# Patient Record
Sex: MALE | Race: WHITE | Employment: OTHER | URBAN - METROPOLITAN AREA
[De-identification: names, ages, dates, MRNs, and addresses within clinical notes are randomized per-mention and may not be internally consistent; named-entity substitution may affect disease eponyms.]

---

## 2017-02-15 ENCOUNTER — HOSPITAL ENCOUNTER (EMERGENCY)
Age: 72
Discharge: HOME | End: 2017-02-15
Attending: EMERGENCY MEDICINE
Payer: MEDICARE

## 2017-02-15 VITALS
RESPIRATION RATE: 20 BRPM | TEMPERATURE: 100 F | SYSTOLIC BLOOD PRESSURE: 113 MMHG | DIASTOLIC BLOOD PRESSURE: 52 MMHG | HEIGHT: 69 IN | BODY MASS INDEX: 38.51 KG/M2 | WEIGHT: 260 LBS | HEART RATE: 88 BPM | OXYGEN SATURATION: 99 %

## 2017-02-15 DIAGNOSIS — B34.9 VIRAL SYNDROME: Primary | ICD-10-CM

## 2017-02-15 LAB
BILIRUB UR QL STRIP.AUTO: NEGATIVE
CLARITY UR REFRACT.AUTO: CLEAR
COLOR UR AUTO: YELLOW
GLUCOSE UR STRIP.AUTO-MCNC: NEGATIVE MG/DL
KETONES UR STRIP.AUTO-MCNC: NEGATIVE MG/DL
LEUKOCYTE ESTERASE UR QL STRIP.AUTO: NEGATIVE
NITRITE UR QL STRIP.AUTO: NEGATIVE
PH UR STRIP.AUTO: 6.5 [PH] (ref 4.5–8)
RBC UR QL AUTO: NEGATIVE
SP GR UR STRIP.AUTO: 1.02 (ref 1–1.03)
UROBILINOGEN UR STRIP.AUTO-MCNC: 0.2 MG/DL

## 2017-02-15 PROCEDURE — 99284 EMERGENCY DEPT VISIT MOD MDM: CPT

## 2017-02-15 PROCEDURE — 96374 THER/PROPH/DIAG INJ IV PUSH: CPT

## 2017-02-15 PROCEDURE — 81003 URINALYSIS AUTO W/O SCOPE: CPT | Performed by: EMERGENCY MEDICINE

## 2017-02-15 PROCEDURE — 96361 HYDRATE IV INFUSION ADD-ON: CPT

## 2017-02-15 RX ORDER — OSELTAMIVIR PHOSPHATE 75 MG/1
75 CAPSULE ORAL 2 TIMES DAILY
Qty: 10 CAPSULE | Refills: 0 | Status: ON HOLD | OUTPATIENT
Start: 2017-02-15 | End: 2017-02-16

## 2017-02-15 RX ORDER — KETOROLAC TROMETHAMINE 30 MG/ML
30 INJECTION, SOLUTION INTRAMUSCULAR; INTRAVENOUS ONCE
Status: COMPLETED | OUTPATIENT
Start: 2017-02-15 | End: 2017-02-15

## 2017-02-15 NOTE — ED PROVIDER NOTES
Patient Seen in: THE [facility] Emergency Department In [place]    History   Patient presents with:  Cough/URI    Stated Complaint: cough and fever    HPI    21-year-old male presents with rhinorrhea, body aches, fever. Symptoms began 2 days ago.   Patient re 1 tablet (20 mg total) by mouth nightly. Dutasteride 0.5 MG Oral Cap,  Take 1 capsule (0.5 mg total) by mouth daily. Silodosin (RAPAFLO) 8 MG Oral Cap,  Take 1 capsule (8 mg total) by mouth daily. GLUCOSAMINE-CHONDROITIN OR,  Take by mouth.    Aury Pascal 99%        Physical Exam    General:  Vitals as listed. No acute distress   HEENT: Sclerae anicteric. Conjunctivae show no pallor. Dry mucous membranes. No posterior pharyngeal exudate.   No palpable lymphadenopathy  Neck: supple, no rigidity   Lungs: g

## 2017-02-16 ENCOUNTER — HOSPITAL ENCOUNTER (OUTPATIENT)
Facility: HOSPITAL | Age: 72
Setting detail: OBSERVATION
Discharge: HOME | End: 2017-02-16
Attending: EMERGENCY MEDICINE | Admitting: HOSPITALIST
Payer: MEDICARE

## 2017-02-16 ENCOUNTER — APPOINTMENT (OUTPATIENT)
Dept: GENERAL RADIOLOGY | Facility: HOSPITAL | Age: 72
End: 2017-02-16
Attending: EMERGENCY MEDICINE
Payer: MEDICARE

## 2017-02-16 VITALS
TEMPERATURE: 98 F | BODY MASS INDEX: 37.22 KG/M2 | RESPIRATION RATE: 20 BRPM | HEIGHT: 70 IN | SYSTOLIC BLOOD PRESSURE: 111 MMHG | WEIGHT: 260 LBS | OXYGEN SATURATION: 97 % | HEART RATE: 84 BPM | DIASTOLIC BLOOD PRESSURE: 49 MMHG

## 2017-02-16 DIAGNOSIS — J40 BRONCHITIS: Primary | ICD-10-CM

## 2017-02-16 LAB
ADENOVIRUS PCR:: NEGATIVE
ALBUMIN SERPL-MCNC: 3.5 G/DL (ref 3.5–4.8)
ALP LIVER SERPL-CCNC: 47 U/L (ref 45–117)
ALT SERPL-CCNC: 38 U/L (ref 17–63)
AST SERPL-CCNC: 37 U/L (ref 15–41)
B PERT DNA SPEC QL NAA+PROBE: NEGATIVE
BASOPHILS # BLD AUTO: 0.04 X10(3) UL (ref 0–0.1)
BASOPHILS NFR BLD AUTO: 0.7 %
BILIRUB SERPL-MCNC: 0.6 MG/DL (ref 0.1–2)
BUN BLD-MCNC: 16 MG/DL (ref 8–20)
C PNEUM DNA SPEC QL NAA+PROBE: NEGATIVE
CALCIUM BLD-MCNC: 8.3 MG/DL (ref 8.3–10.3)
CHLORIDE: 105 MMOL/L (ref 101–111)
CO2: 23 MMOL/L (ref 22–32)
CORONAVIRUS 229E PCR:: POSITIVE
CORONAVIRUS HKU1 PCR:: NEGATIVE
CORONAVIRUS NL63 PCR:: NEGATIVE
CORONAVIRUS OC43 PCR:: NEGATIVE
CREAT BLD-MCNC: 0.98 MG/DL (ref 0.7–1.3)
EOSINOPHIL # BLD AUTO: 0.01 X10(3) UL (ref 0–0.3)
EOSINOPHIL NFR BLD AUTO: 0.2 %
ERYTHROCYTE [DISTWIDTH] IN BLOOD BY AUTOMATED COUNT: 14.8 % (ref 11.5–16)
FLUAV H1 2009 PAND RNA SPEC QL NAA+PROBE: NEGATIVE
FLUAV H1 RNA SPEC QL NAA+PROBE: NEGATIVE
FLUAV H3 RNA SPEC QL NAA+PROBE: NEGATIVE
FLUAV RNA SPEC QL NAA+PROBE: NEGATIVE
FLUBV RNA SPEC QL NAA+PROBE: NEGATIVE
GLUCOSE BLD-MCNC: 114 MG/DL (ref 70–99)
GLUCOSE BLD-MCNC: 82 MG/DL (ref 65–99)
GLUCOSE BLD-MCNC: 98 MG/DL (ref 65–99)
HCT VFR BLD AUTO: 39.6 % (ref 37–53)
HGB BLD-MCNC: 13.6 G/DL (ref 13–17)
IMMATURE GRANULOCYTE COUNT: 0.02 X10(3) UL (ref 0–1)
IMMATURE GRANULOCYTE RATIO %: 0.3 %
LACTIC ACID: 1.5 MMOL/L (ref 0.5–2)
LYMPHOCYTES # BLD AUTO: 0.38 X10(3) UL (ref 0.9–4)
LYMPHOCYTES NFR BLD AUTO: 6.3 %
M PROTEIN MFR SERPL ELPH: 7 G/DL (ref 6.1–8.3)
MCH RBC QN AUTO: 28.4 PG (ref 27–33.2)
MCHC RBC AUTO-ENTMCNC: 34.3 G/DL (ref 31–37)
MCV RBC AUTO: 82.7 FL (ref 80–99)
METAPNEUMOVIRUS PCR:: NEGATIVE
MONOCYTES # BLD AUTO: 0.15 X10(3) UL (ref 0.1–0.6)
MONOCYTES NFR BLD AUTO: 2.5 %
MYCOPLASMA PNEUMONIA PCR:: NEGATIVE
NEUTROPHIL ABS PRELIM: 5.43 X10 (3) UL (ref 1.3–6.7)
NEUTROPHILS # BLD AUTO: 5.43 X10(3) UL (ref 1.3–6.7)
NEUTROPHILS NFR BLD AUTO: 90 %
PARAINFLUENZA 1 PCR:: NEGATIVE
PARAINFLUENZA 2 PCR:: NEGATIVE
PARAINFLUENZA 3 PCR:: NEGATIVE
PARAINFLUENZA 4 PCR:: NEGATIVE
PLATELET # BLD AUTO: 160 10(3)UL (ref 150–450)
POTASSIUM SERPL-SCNC: 4 MMOL/L (ref 3.6–5.1)
RBC # BLD AUTO: 4.79 X10(6)UL (ref 3.8–5.8)
RED CELL DISTRIBUTION WIDTH-SD: 44.6 FL (ref 35.1–46.3)
RHINOVIRUS/ENTERO PCR:: NEGATIVE
RSV RNA SPEC QL NAA+PROBE: NEGATIVE
SODIUM SERPL-SCNC: 142 MMOL/L (ref 136–144)
TROPONIN: <0.046 NG/ML (ref ?–0.05)
WBC # BLD AUTO: 6 X10(3) UL (ref 4–13)

## 2017-02-16 PROCEDURE — 99285 EMERGENCY DEPT VISIT HI MDM: CPT

## 2017-02-16 PROCEDURE — 82962 GLUCOSE BLOOD TEST: CPT

## 2017-02-16 PROCEDURE — 83605 ASSAY OF LACTIC ACID: CPT | Performed by: EMERGENCY MEDICINE

## 2017-02-16 PROCEDURE — 36415 COLL VENOUS BLD VENIPUNCTURE: CPT

## 2017-02-16 PROCEDURE — 87633 RESP VIRUS 12-25 TARGETS: CPT | Performed by: EMERGENCY MEDICINE

## 2017-02-16 PROCEDURE — 93010 ELECTROCARDIOGRAM REPORT: CPT

## 2017-02-16 PROCEDURE — 71020 XR CHEST PA + LAT CHEST (CPT=71020): CPT

## 2017-02-16 PROCEDURE — 84484 ASSAY OF TROPONIN QUANT: CPT | Performed by: EMERGENCY MEDICINE

## 2017-02-16 PROCEDURE — 87999 UNLISTED MICROBIOLOGY PX: CPT

## 2017-02-16 PROCEDURE — 87798 DETECT AGENT NOS DNA AMP: CPT | Performed by: EMERGENCY MEDICINE

## 2017-02-16 PROCEDURE — 87040 BLOOD CULTURE FOR BACTERIA: CPT | Performed by: EMERGENCY MEDICINE

## 2017-02-16 PROCEDURE — 93005 ELECTROCARDIOGRAM TRACING: CPT

## 2017-02-16 PROCEDURE — 87486 CHLMYD PNEUM DNA AMP PROBE: CPT | Performed by: EMERGENCY MEDICINE

## 2017-02-16 PROCEDURE — 85025 COMPLETE CBC W/AUTO DIFF WBC: CPT | Performed by: EMERGENCY MEDICINE

## 2017-02-16 PROCEDURE — 96360 HYDRATION IV INFUSION INIT: CPT

## 2017-02-16 PROCEDURE — 80053 COMPREHEN METABOLIC PANEL: CPT | Performed by: EMERGENCY MEDICINE

## 2017-02-16 PROCEDURE — 87581 M.PNEUMON DNA AMP PROBE: CPT | Performed by: EMERGENCY MEDICINE

## 2017-02-16 RX ORDER — SOTALOL HYDROCHLORIDE 80 MG/1
120 TABLET ORAL
Status: DISCONTINUED | OUTPATIENT
Start: 2017-02-16 | End: 2017-02-16

## 2017-02-16 RX ORDER — DILTIAZEM HYDROCHLORIDE 240 MG/1
240 CAPSULE, COATED, EXTENDED RELEASE ORAL
Status: DISCONTINUED | OUTPATIENT
Start: 2017-02-16 | End: 2017-02-16

## 2017-02-16 RX ORDER — ALFUZOSIN HYDROCHLORIDE 10 MG/1
10 TABLET, EXTENDED RELEASE ORAL
Status: DISCONTINUED | OUTPATIENT
Start: 2017-02-16 | End: 2017-02-16

## 2017-02-16 RX ORDER — IBUPROFEN 600 MG/1
600 TABLET ORAL EVERY 6 HOURS PRN
Status: DISCONTINUED | OUTPATIENT
Start: 2017-02-16 | End: 2017-02-16

## 2017-02-16 RX ORDER — ACETAMINOPHEN 500 MG
1000 TABLET ORAL ONCE
Status: COMPLETED | OUTPATIENT
Start: 2017-02-16 | End: 2017-02-16

## 2017-02-16 RX ORDER — AMOXICILLIN 500 MG
1 CAPSULE ORAL
COMMUNITY

## 2017-02-16 RX ORDER — SODIUM CHLORIDE 9 MG/ML
INJECTION, SOLUTION INTRAVENOUS CONTINUOUS
Status: DISCONTINUED | OUTPATIENT
Start: 2017-02-16 | End: 2017-02-16

## 2017-02-16 RX ORDER — FINASTERIDE 5 MG/1
5 TABLET, FILM COATED ORAL DAILY
Status: DISCONTINUED | OUTPATIENT
Start: 2017-02-16 | End: 2017-02-16

## 2017-02-16 RX ORDER — ACETAMINOPHEN 325 MG/1
650 TABLET ORAL EVERY 6 HOURS PRN
Status: DISCONTINUED | OUTPATIENT
Start: 2017-02-16 | End: 2017-02-16

## 2017-02-16 RX ORDER — SODIUM CHLORIDE 9 MG/ML
INJECTION, SOLUTION INTRAVENOUS CONTINUOUS
Status: ACTIVE | OUTPATIENT
Start: 2017-02-16 | End: 2017-02-16

## 2017-02-16 RX ORDER — ONDANSETRON 2 MG/ML
4 INJECTION INTRAMUSCULAR; INTRAVENOUS EVERY 6 HOURS PRN
Status: DISCONTINUED | OUTPATIENT
Start: 2017-02-16 | End: 2017-02-16

## 2017-02-16 RX ORDER — HEPARIN SODIUM 5000 [USP'U]/ML
5000 INJECTION, SOLUTION INTRAVENOUS; SUBCUTANEOUS EVERY 12 HOURS
Status: DISCONTINUED | OUTPATIENT
Start: 2017-02-16 | End: 2017-02-16

## 2017-02-16 NOTE — CM/SW NOTE
02/16/17 1400   CM/SW Screening   Referral [id number] staff; Chart review;Nursing rounds   Patient's Mental Status Alert;Oriented   Patient lives with Spouse   Patient Status Prior to Admission   Independent with ADLs an

## 2017-02-16 NOTE — ED NOTES
Pt is resting on cart with wife at bedside. Pt states he feels better at this time. Will continue to monitor pt.

## 2017-02-16 NOTE — H&P
.  CC: Patient presents with:  Fever Sepsis (infectious)       PCP: Sanford Ocampo MD    History of Present Illness: Patient is a 66year old male with PMH sig for afib on sotalol and xarelto, HTN who presents with fever.  Started 4 days ago with fever to 10 mg total) by mouth 2 (two) times daily. Disp: 10 capsule Rfl: 0   DiltiaZEM HCl ER Coated Beads 240 MG Oral Capsule SR 24 Hr Take 1 capsule (240 mg total) by mouth once daily.  Disp: 90 capsule Rfl: 2   Rivaroxaban (XARELTO) 20 MG Oral Tab Take 1 tablet (20 Relation Age of Onset   • Cancer Mother    • Diabetes Mother        Review of Systems  Comprehensive ROS reviewed and negative except for what's stated above.        OBJECTIVE:  /64 mmHg  Pulse 85  Temp(Src) 98.7 °F (37.1 °C) (Oral)  Resp 20  Ht 5' 10 MD on 2/16/2017 at 8:06     Approved by: Leopoldo Moody, MD               [place] [facility] note reviewed    ASSESSMENT / PLAN:     Fever- likely 2/2 +coronavirus. Some of his URI sx have been improving.  - manage supportively with tylenol prn.  Ibuprofen prn okay to

## 2017-02-16 NOTE — PROGRESS NOTES
02/16/17 9982   Provider Notification   Reason for Communication Review case   Provider Name Dr. Aaron Sparks   Method of Communication Page   Response Waiting for response   Pt to floor. Medications updated, new meds added.  Pt takes metformin at home, accuchec

## 2017-02-16 NOTE — ED INITIAL ASSESSMENT (HPI)
Pt states he had a fever of 102.4 and had 3 advils. Pt states he has chills. He was seen in [facility] yesterday and was told he has the flu.

## 2017-02-16 NOTE — PLAN OF CARE
Assumed care of patient at7am  AOx4 in NAD; temp 101.8 this am with 3 hours prior tylenol administration; Dr Herminio Smith notified; order for advil;    Result from flue swab + corona virus  Patient noted to be perspiring; temp rechecked 98.7 F; per request advil s

## 2017-02-16 NOTE — PROGRESS NOTES
Pt admitted to floor. Wife at bedside. Navigator complete. Med rec updated and Md paged. Pt with no pain, no SOB. No major complaints. RA. VSS. Up with assist. Tele and  on. IV fluids started. PT on droplet isolation to rule our flu.  Pt resting in bed a

## 2017-02-16 NOTE — ED PROVIDER NOTES
Patient Seen in: [facility] Emergency Department    History   Patient presents with:  Fever Sepsis (infectious)    Stated Complaint: FLU SXS, FEVER    HPI    The patient is a 79-year-old male with multiple past medical problems, presenting to the [facility] daily.   Rivaroxaban (XARELTO) 20 MG Oral Tab,  Take 1 tablet (20 mg total) by mouth once daily. Quinapril HCl 40 MG Oral Tab,  Take 1 tablet (40 mg total) by mouth 2 (two) times daily.    Niacin ER, Antihyperlipidemic, 1000 MG Oral Tab CR,  Take 1 tablet otherwise stated in HPI.     Physical Exam       ED Triage Vitals   BP 02/16/17 0229 143/66 mmHg   Pulse 02/16/17 0229 80   Resp 02/16/17 0229 18   Temp 02/16/17 0229 99.7 °F (37.6 °C)   Temp src 02/16/17 0229 Oral   SpO2 02/16/17 0229 95 %   O2 Device 02/1 following orders were created for panel order CBC WITH DIFFERENTIAL WITH PLATELET.   Procedure                               Abnormality         Status                     ---------                               -----------         ------ radiology. I discussed with the patient and his wife outpatient management of bronchitis and influenza, and his wife is uncomfortable bringing him home due to his previous history of atrial fibrillation, that seems to be exacerbated with previous URIs.   C

## 2017-02-17 LAB
ATRIAL RATE: 75 BPM
P AXIS: 44 DEGREES
P-R INTERVAL: 196 MS
Q-T INTERVAL: 408 MS
QRS DURATION: 112 MS
QTC CALCULATION (BEZET): 455 MS
R AXIS: -46 DEGREES
T AXIS: 2 DEGREES
VENTRICULAR RATE: 75 BPM

## 2017-04-04 RX ORDER — MULTIVITAMIN
1 TABLET ORAL DAILY
COMMUNITY

## 2017-04-04 RX ORDER — MULTIVIT-MIN/IRON/FOLIC ACID/K 18-600-40
3000 CAPSULE ORAL DAILY
COMMUNITY

## 2017-04-04 RX ORDER — ASCORBIC ACID 500 MG
1000 TABLET ORAL DAILY
COMMUNITY

## 2017-04-07 ENCOUNTER — HOSPITAL ENCOUNTER (OUTPATIENT)
Dept: INTERVENTIONAL RADIOLOGY/VASCULAR | Facility: HOSPITAL | Age: 72
Discharge: HOME | End: 2017-04-07
Attending: INTERNAL MEDICINE | Admitting: INTERNAL MEDICINE
Payer: MEDICARE

## 2017-04-07 VITALS
WEIGHT: 260 LBS | OXYGEN SATURATION: 100 % | DIASTOLIC BLOOD PRESSURE: 70 MMHG | RESPIRATION RATE: 18 BRPM | HEIGHT: 70 IN | SYSTOLIC BLOOD PRESSURE: 117 MMHG | BODY MASS INDEX: 37.22 KG/M2 | HEART RATE: 66 BPM

## 2017-04-07 DIAGNOSIS — I48.19 PERSISTENT ATRIAL FIBRILLATION (HCC): ICD-10-CM

## 2017-04-07 PROCEDURE — 92960 CARDIOVERSION ELECTRIC EXT: CPT

## 2017-04-07 PROCEDURE — 5A2204Z RESTORATION OF CARDIAC RHYTHM, SINGLE: ICD-10-PCS | Performed by: INTERNAL MEDICINE

## 2017-04-07 PROCEDURE — 93010 ELECTROCARDIOGRAM REPORT: CPT | Performed by: INTERNAL MEDICINE

## 2017-04-07 PROCEDURE — 93005 ELECTROCARDIOGRAM TRACING: CPT

## 2017-04-07 RX ORDER — SODIUM CHLORIDE 9 MG/ML
INJECTION, SOLUTION INTRAVENOUS CONTINUOUS
Status: DISCONTINUED | OUTPATIENT
Start: 2017-04-07 | End: 2017-04-07

## 2017-04-07 NOTE — H&P
ASSESSMENT:      - PAF last episode 6/06 now recurrence 3/2017 on sotalol  - increased weight 255  - DM  - HTN  - Hyperlipidemia  - RENETTA - CPAP  - LAFB    - Xarelto       PLAN:      - EKG normal no QT prolongation Sotalol proarrhythmia reviewed  - Risk o about 1.0 oz of alcohol per week.  He reports that he does not use illicit drugs.       Allergies:  No Known Allergies    Current Meds:    Current Outpatient Prescriptions:  Sotalol HCl 120 MG Oral Tab  Take 1 tablet (120 mg total) by mouth 2 (two) times da qday  Disp:   Rfl:     VITAMIN C OR  1 po qday  Disp:   Rfl:               ROS:    Constitutional: negative for fevers  Eyes: negative for visual disturbance  Ears, nose, mouth, throat, and face: negative for epistaxis  Respiratory: negative for dyspnea on Value    03/23/2017  51    08/01/2016  50    01/08/2016      Comment:    Unable to calculate LDL when TGL > 400.      ----------  TRIGLYCERIDES (mg/dL)    Date  Value    01/30/2015  321*    04/15/2014  233*    01/28/2014  225*    ----------  AST (SGOT) (IU

## 2017-04-07 NOTE — PROCEDURES
[facility]    PATIENT'S NAME: Dede Hartman   ATTENDING PHYSICIAN: Tino Leong M.D. OPERATING PHYSICIAN: Tino Leong M.D.    PATIENT ACCOUNT#:   [de-identified]    LOCATION:  [facility] 7 EDWP 10  MEDICAL RECORD #:   [id number]       DATE OF BIR

## 2017-04-19 ENCOUNTER — APPOINTMENT (OUTPATIENT)
Dept: CT IMAGING | Facility: HOSPITAL | Age: 72
DRG: 872 | End: 2017-04-19
Payer: MEDICARE

## 2017-04-19 ENCOUNTER — HOSPITAL ENCOUNTER (INPATIENT)
Facility: HOSPITAL | Age: 72
LOS: 3 days | Discharge: HOME | DRG: 872 | End: 2017-04-24
Admitting: INTERNAL MEDICINE
Payer: MEDICARE

## 2017-04-19 DIAGNOSIS — R10.9 FLANK PAIN, ACUTE: Primary | ICD-10-CM

## 2017-04-19 DIAGNOSIS — R50.9 FEVER, UNSPECIFIED FEVER CAUSE: ICD-10-CM

## 2017-04-19 PROCEDURE — 83690 ASSAY OF LIPASE: CPT

## 2017-04-19 PROCEDURE — 80053 COMPREHEN METABOLIC PANEL: CPT

## 2017-04-19 PROCEDURE — 96365 THER/PROPH/DIAG IV INF INIT: CPT

## 2017-04-19 PROCEDURE — 96361 HYDRATE IV INFUSION ADD-ON: CPT

## 2017-04-19 PROCEDURE — 36415 COLL VENOUS BLD VENIPUNCTURE: CPT

## 2017-04-19 PROCEDURE — 81003 URINALYSIS AUTO W/O SCOPE: CPT

## 2017-04-19 PROCEDURE — 74176 CT ABD & PELVIS W/O CONTRAST: CPT

## 2017-04-19 PROCEDURE — 87040 BLOOD CULTURE FOR BACTERIA: CPT

## 2017-04-19 PROCEDURE — 87086 URINE CULTURE/COLONY COUNT: CPT

## 2017-04-19 PROCEDURE — 99285 EMERGENCY DEPT VISIT HI MDM: CPT

## 2017-04-19 PROCEDURE — 85025 COMPLETE CBC W/AUTO DIFF WBC: CPT

## 2017-04-19 RX ORDER — ACETAMINOPHEN 500 MG
1000 TABLET ORAL ONCE
Status: COMPLETED | OUTPATIENT
Start: 2017-04-19 | End: 2017-04-19

## 2017-04-19 RX ORDER — SODIUM CHLORIDE 9 MG/ML
INJECTION, SOLUTION INTRAVENOUS CONTINUOUS
Status: DISCONTINUED | OUTPATIENT
Start: 2017-04-19 | End: 2017-04-20

## 2017-04-19 RX ORDER — HYDROMORPHONE HYDROCHLORIDE 1 MG/ML
1 INJECTION, SOLUTION INTRAMUSCULAR; INTRAVENOUS; SUBCUTANEOUS ONCE
Status: DISCONTINUED | OUTPATIENT
Start: 2017-04-19 | End: 2017-04-24

## 2017-04-20 PROBLEM — R10.9 FLANK PAIN, ACUTE: Status: ACTIVE | Noted: 2017-04-20

## 2017-04-20 PROBLEM — R50.9 FEVER, UNSPECIFIED FEVER CAUSE: Status: ACTIVE | Noted: 2017-04-20

## 2017-04-20 PROCEDURE — 83036 HEMOGLOBIN GLYCOSYLATED A1C: CPT | Performed by: INTERNAL MEDICINE

## 2017-04-20 PROCEDURE — 85025 COMPLETE CBC W/AUTO DIFF WBC: CPT | Performed by: INTERNAL MEDICINE

## 2017-04-20 PROCEDURE — 80053 COMPREHEN METABOLIC PANEL: CPT | Performed by: INTERNAL MEDICINE

## 2017-04-20 PROCEDURE — 96375 TX/PRO/DX INJ NEW DRUG ADDON: CPT

## 2017-04-20 PROCEDURE — 82962 GLUCOSE BLOOD TEST: CPT

## 2017-04-20 PROCEDURE — 94660 CPAP INITIATION&MGMT: CPT

## 2017-04-20 PROCEDURE — 93010 ELECTROCARDIOGRAM REPORT: CPT | Performed by: INTERNAL MEDICINE

## 2017-04-20 PROCEDURE — 84443 ASSAY THYROID STIM HORMONE: CPT | Performed by: HOSPITALIST

## 2017-04-20 PROCEDURE — 83605 ASSAY OF LACTIC ACID: CPT | Performed by: HOSPITALIST

## 2017-04-20 PROCEDURE — 93005 ELECTROCARDIOGRAM TRACING: CPT

## 2017-04-20 RX ORDER — HYDROMORPHONE HYDROCHLORIDE 1 MG/ML
0.8 INJECTION, SOLUTION INTRAMUSCULAR; INTRAVENOUS; SUBCUTANEOUS EVERY 2 HOUR PRN
Status: DISCONTINUED | OUTPATIENT
Start: 2017-04-20 | End: 2017-04-24

## 2017-04-20 RX ORDER — SODIUM CHLORIDE 9 MG/ML
INJECTION, SOLUTION INTRAVENOUS CONTINUOUS
Status: DISCONTINUED | OUTPATIENT
Start: 2017-04-20 | End: 2017-04-22

## 2017-04-20 RX ORDER — LATANOPROST 50 UG/ML
1 SOLUTION/ DROPS OPHTHALMIC NIGHTLY
Status: DISCONTINUED | OUTPATIENT
Start: 2017-04-20 | End: 2017-04-24

## 2017-04-20 RX ORDER — ONDANSETRON 2 MG/ML
4 INJECTION INTRAMUSCULAR; INTRAVENOUS EVERY 4 HOURS PRN
Status: CANCELLED | OUTPATIENT
Start: 2017-04-20

## 2017-04-20 RX ORDER — ONDANSETRON 2 MG/ML
4 INJECTION INTRAMUSCULAR; INTRAVENOUS EVERY 6 HOURS PRN
Status: DISCONTINUED | OUTPATIENT
Start: 2017-04-20 | End: 2017-04-24

## 2017-04-20 RX ORDER — SOTALOL HYDROCHLORIDE 80 MG/1
120 TABLET ORAL 2 TIMES DAILY
Status: DISCONTINUED | OUTPATIENT
Start: 2017-04-20 | End: 2017-04-24

## 2017-04-20 RX ORDER — FINASTERIDE 5 MG/1
5 TABLET, FILM COATED ORAL DAILY
Status: DISCONTINUED | OUTPATIENT
Start: 2017-04-20 | End: 2017-04-24

## 2017-04-20 RX ORDER — DILTIAZEM HYDROCHLORIDE 240 MG/1
240 CAPSULE, COATED, EXTENDED RELEASE ORAL
Status: DISCONTINUED | OUTPATIENT
Start: 2017-04-20 | End: 2017-04-24

## 2017-04-20 RX ORDER — HYDROMORPHONE HYDROCHLORIDE 1 MG/ML
0.5 INJECTION, SOLUTION INTRAMUSCULAR; INTRAVENOUS; SUBCUTANEOUS EVERY 30 MIN PRN
Status: CANCELLED | OUTPATIENT
Start: 2017-04-20 | End: 2017-04-20

## 2017-04-20 RX ORDER — KETOROLAC TROMETHAMINE 30 MG/ML
15 INJECTION, SOLUTION INTRAMUSCULAR; INTRAVENOUS EVERY 6 HOURS PRN
Status: ACTIVE | OUTPATIENT
Start: 2017-04-20 | End: 2017-04-22

## 2017-04-20 RX ORDER — PRAVASTATIN SODIUM 20 MG
20 TABLET ORAL NIGHTLY
Status: DISCONTINUED | OUTPATIENT
Start: 2017-04-20 | End: 2017-04-24

## 2017-04-20 RX ORDER — HYDROMORPHONE HYDROCHLORIDE 1 MG/ML
0.2 INJECTION, SOLUTION INTRAMUSCULAR; INTRAVENOUS; SUBCUTANEOUS EVERY 2 HOUR PRN
Status: DISCONTINUED | OUTPATIENT
Start: 2017-04-20 | End: 2017-04-24

## 2017-04-20 RX ORDER — HYDROMORPHONE HYDROCHLORIDE 1 MG/ML
0.4 INJECTION, SOLUTION INTRAMUSCULAR; INTRAVENOUS; SUBCUTANEOUS EVERY 2 HOUR PRN
Status: DISCONTINUED | OUTPATIENT
Start: 2017-04-20 | End: 2017-04-24

## 2017-04-20 RX ORDER — HYDROCODONE BITARTRATE AND ACETAMINOPHEN 5; 325 MG/1; MG/1
1-2 TABLET ORAL
Qty: 20 TABLET | Refills: 0 | Status: SHIPPED | OUTPATIENT
Start: 2017-04-20 | End: 2017-04-30

## 2017-04-20 RX ORDER — ACETAMINOPHEN 325 MG/1
650 TABLET ORAL EVERY 6 HOURS PRN
Status: DISCONTINUED | OUTPATIENT
Start: 2017-04-20 | End: 2017-04-24

## 2017-04-20 RX ORDER — METRONIDAZOLE 500 MG/1
500 TABLET ORAL ONCE
Status: COMPLETED | OUTPATIENT
Start: 2017-04-20 | End: 2017-04-20

## 2017-04-20 RX ORDER — DEXTROSE MONOHYDRATE 25 G/50ML
50 INJECTION, SOLUTION INTRAVENOUS
Status: DISCONTINUED | OUTPATIENT
Start: 2017-04-20 | End: 2017-04-24

## 2017-04-20 RX ORDER — IBUPROFEN 800 MG/1
800 TABLET ORAL EVERY 8 HOURS PRN
Qty: 42 TABLET | Refills: 0 | Status: SHIPPED | OUTPATIENT
Start: 2017-04-20 | End: 2017-06-19

## 2017-04-20 RX ORDER — DILTIAZEM HYDROCHLORIDE 5 MG/ML
10 INJECTION INTRAVENOUS ONCE
Status: COMPLETED | OUTPATIENT
Start: 2017-04-20 | End: 2017-04-20

## 2017-04-20 RX ORDER — KETOROLAC TROMETHAMINE 30 MG/ML
30 INJECTION, SOLUTION INTRAMUSCULAR; INTRAVENOUS EVERY 6 HOURS PRN
Status: DISCONTINUED | OUTPATIENT
Start: 2017-04-20 | End: 2017-04-20

## 2017-04-20 RX ORDER — SODIUM CHLORIDE 9 MG/ML
INJECTION, SOLUTION INTRAVENOUS CONTINUOUS
Status: CANCELLED | OUTPATIENT
Start: 2017-04-20 | End: 2017-04-20

## 2017-04-20 RX ORDER — ALFUZOSIN HYDROCHLORIDE 10 MG/1
10 TABLET, EXTENDED RELEASE ORAL
Status: DISCONTINUED | OUTPATIENT
Start: 2017-04-20 | End: 2017-04-24

## 2017-04-20 RX ORDER — LISINOPRIL 40 MG/1
40 TABLET ORAL DAILY
Status: DISCONTINUED | OUTPATIENT
Start: 2017-04-20 | End: 2017-04-24

## 2017-04-20 RX ORDER — ASPIRIN 81 MG/1
81 TABLET, CHEWABLE ORAL DAILY
Status: DISCONTINUED | OUTPATIENT
Start: 2017-04-20 | End: 2017-04-24

## 2017-04-20 NOTE — PLAN OF CARE
CARDIOVASCULAR - ADULT    • Maintains optimal cardiac output and hemodynamic stability Progressing        PAIN - ADULT    • Verbalizes/displays adequate comfort level or patient's stated pain goal Progressing        Patient/Family Goals    • Patient/Family

## 2017-04-20 NOTE — PROGRESS NOTES
[facility] Pharmacy Note:  Age Based Dose Adjustment    Araceli [place] has been prescribed ketorolac (TORADOL) 30 mg IV every 6 hours as needed. Patient is >71 years old therefore the dose has been adjusted to 15 mg IV every 6 hours as needed.       Thank you

## 2017-04-20 NOTE — RESPIRATORY THERAPY NOTE
RENETTA : EQUIPMENT USE: DAILY SUMMARY                                            SET MODE:                                          USAGE IN HOURS:                                          90% EXP. PRESSURE(EPAP):

## 2017-04-20 NOTE — CM/SW NOTE
04/20/17 1400   CM/SW Screening   Referral [facility] staff; Chart review;Nursing rounds   Patient's Mental Status Alert;Oriented   Patient lives with Spouse   Patient Status Prior to Admission   Independent with ADLs an

## 2017-04-20 NOTE — CONSULTS
[facility]  Report of Consultation    Nasir Rodriguez Patient Status:  Observation    1945 MRN [id number]   [facility] 5NW-A Attending Ezio Azevedo, *   Hosp Day # 1 PCP Isaak Ahumada MD     Reason for Consultation: Allergies    Current Medications:      Current facility-administered medications:   •  0.9%  NaCl infusion, , Intravenous, Continuous  •  acetaminophen (TYLENOL) tab 650 mg, 650 mg, Oral, Q6H PRN  •  ondansetron HCl (ZOFRAN) injection 4 mg, 4 mg, Intraveno current facility-administered medications on file prior to encounter. Current Outpatient Prescriptions on File Prior to Encounter:  Vitamin D, Cholecalciferol, 1000 units Oral Tab Take 1,000 Units by mouth daily.  Disp:  Rfl:    Vitamin C 500 MG Oral Tab T Systems:    10 point review performed pertinent positives and negatives per history of present illness. Physical Exam:    Blood pressure 83/59, pulse 77, temperature 100 °F (37.8 °C), temperature source Oral, resp.  rate 18, height 70\", weight 255 lb (1 paravertebral soft tissues are unremarkable. 4/19/2017  IMPRESSION: 1. No lumbar vertebral compression deformities. 2.  Mild-moderate lumbar disc narrowing, spondylosis, and facet arthrosis. 3.  Normal vertebral bony mineralization.      Ct Abdomen+pel appendigitis. There is a few scattered diverticula of the left colon without evidence of diverticulitis. ABDOMINAL WALL:  Left inguinal hernia containing fat. BONES:  Mild degenerative changes of the spine. No bony lesion or fracture.  PELVIC ORGANS:  Mode

## 2017-04-20 NOTE — ED PROVIDER NOTES
Patient Seen in: [facility] Emergency Department    History   Patient presents with:  Fever (infectious)    Stated Complaint: FEVER    HPI    Patient is a pleasant 53-year-old presented to ER with complaint that over the past few days he has had lef 1,000 mg by mouth every evening. Vitamin C 500 MG Oral Tab,  Take 500 mg by mouth daily. Sotalol HCl 120 MG Oral Tab,  Take 1 tablet (120 mg total) by mouth 2 (two) times daily.    Dutasteride 0.5 MG Oral Cap,  Take 1 capsule (0.5 mg total) by mouth silvia BP 04/19/17 2105 143/90 mmHg   Pulse 04/19/17 2105 104   Resp 04/19/17 2105 22   Temp 04/19/17 2105 99.4 °F (37.4 °C)   Temp src 04/19/17 2105 Temporal   SpO2 04/19/17 2105 96 %   O2 Device 04/19/17 2057 None (Room air)       Current:/82 mmHg  Puls Abnormality         Status                     ---------                               -----------         ------                     CBC W/ DIFFERENTIAL[[id number]]          Abnormal            Final result                 Please view results for th bladder. 2D rendering are generated on the CT scanner workstation to localize potential stones in the cranio-caudal plane. Dose reduction techniques were used.  Dose information is transmitted to the [facility] [employer] of Radiology) Vineet Loo 35 ([address] Nuria Mendez related, the possibility of early pyelonephritis on the left cannot be completely excluded. Correlation with urinalysis is recommended. 3. Prostatic hypertrophy. 4. Left inguinal fat-containing hernia. Dictated by:  Sejal Hines MD on 4/19/2017 at 22:50 Medication List

## 2017-04-20 NOTE — H&P
[facility] Hospitalist H&P       CC: L lower back/flank pain, fevers    PCP: Tree Godoy MD    History of Present Illness:     Pt is a 67 yo with HTN/HL, atrial fibrillation, RENETTA, morbid obesity, who is admitted for L lower back/flank pain.  Pt reports that date: 11/30/2013    Smokeless tobacco: Never Used    Comment: cigar 1 wk; quit cigs 1986    Alcohol Use: No    Comment: socially        Fam Hx  Family History   Problem Relation Age of Onset   • Cancer Mother    • Diabetes Mother        Review of Systems 119      Radiology: Xr Lumbar Spine (min 4 Views) (cpt=72110)    4/19/2017  LUMBAR SPINE RADIOGRAPHIC SERIES, 4 Views, Including Dynamic Lateral Flexion-Extension Films, 4/18/2017. COMPARISON STUDIES: None.  CLINICAL HISTORY:   Persistent nontraumatic low b There is perinephric stranding of both kidneys, left worse than right, which is likely age related. It is possible that the stranding could represent infection within the kidney in the correct clinical setting. Correlation with urinalysis is recommended.  Salma Reynaga revealed epiploic appendigitis of sigmoid colon and some perinephric stranding on the L-- interesting that pt does NOT have abdominal symptoms or dysuria/hematuria.  So sepsis of possible GI or  origin  -UA is negative, await UC.  BC pending  -received I

## 2017-04-20 NOTE — PROGRESS NOTES
Brief Internal Medicine Note    Full Note to Follow      Pt is a 65 yo with HTN/HL, atrial fibrillation, RENETTA, morbid obesity, who is admitted for L lower back/flank pain.   Pt reports that approx 4 days ago, started having sharp moderate-severe intermitte

## 2017-04-20 NOTE — PROGRESS NOTES
Assumed care at 4:34 pm. A/Ox4. On ra. RENETTA protocol. Tolerating clear liquid. Voids. Up with SBA. QID accucheck. Fluids infusing. Will cont to monitor.

## 2017-04-20 NOTE — CONSULTS
INFECTIOUS DISEASE CONSULT NOTE    Guevara Lea Patient Status:  Observation    1945 MRN [id number]   [facility] 5NW-A Attending He Reyes, *   Hosp Day # 1 PCP B Onset   • Cancer Mother    • Diabetes Mother       reports that he quit smoking about 3 years ago. His smoking use included Cigars. He has never used smokeless tobacco. He reports that he does not drink alcohol or use illicit drugs.     Allergies:  No Known mL IVPB, 4.5 g, Intravenous, Q8H  •  ketorolac tromethamine (TORADOL) 30 MG/ML injection 15 mg, 15 mg, Intravenous, Q6H PRN  •  HYDROmorphone HCl PF (DILAUDID) 1 MG/ML injection 1 mg, 1 mg, Intravenous, Once    Review of Systems:    Completed.  See pertinen Data:    Recent Labs   Lab  04/19/17 2115  04/20/17   0619   RBC  5.62  5.54   HGB  15.3  15.2   HCT  45.8  46.3   MCV  81.5  83.6   MCH  27.2  27.4   MCHC  33.4  32.8   RDW  15.3  15.5   NEPRELIM  9.92*  10.87*   WBC  11.0  11.7   PLT  180.0  162.0 mineralization. Ct Abdomen+pelvis Kidneystone 2d Rndr(no Iv,no Oral)(cpt=74176)    4/19/2017  PROCEDURE:  CT ABDOMEN+PELVIS KIDNEYSTONE 2D RNDR(NO IV,NO ORAL)(CPT=74176)  COMPARISON:  None.   INDICATIONS:  FEVER, BACK PAIN, HAD XRAY OF BACK  TECHNIQUE: fracture. PELVIC ORGANS:  Moderate Prosthetic hypertrophy. Andressa Band LUNG BASES:  Normal.  No visible pulmonary or pleural disease. 4/19/2017  CONCLUSION:   1. Epiploic appendigitis of the sigmoid colon. 2. No definite stones or hydronephrosis.  Perinephric

## 2017-04-20 NOTE — ED INITIAL ASSESSMENT (HPI)
Patient with back pain which started Sunday night, he thought he strained his back and had a negative X-ray. He developed fevers and chills since yesterday which has been worsening. Patient taking Tylenol and muscle relaxers without relief.  Pain to left fl

## 2017-04-20 NOTE — ED NOTES
Patient ambulatory to bathroom with dyspnea on exertion noted, did not tolerate well. Patient placed back on monitor and able to catch his breath, room air O2 sat 99% after getting back onto cart.

## 2017-04-21 PROCEDURE — 85652 RBC SED RATE AUTOMATED: CPT | Performed by: INTERNAL MEDICINE

## 2017-04-21 PROCEDURE — 87581 M.PNEUMON DNA AMP PROBE: CPT | Performed by: HOSPITALIST

## 2017-04-21 PROCEDURE — 87999 UNLISTED MICROBIOLOGY PX: CPT

## 2017-04-21 PROCEDURE — 87633 RESP VIRUS 12-25 TARGETS: CPT | Performed by: HOSPITALIST

## 2017-04-21 PROCEDURE — 86644 CMV ANTIBODY: CPT | Performed by: INTERNAL MEDICINE

## 2017-04-21 PROCEDURE — 86645 CMV ANTIBODY IGM: CPT | Performed by: INTERNAL MEDICINE

## 2017-04-21 PROCEDURE — 82962 GLUCOSE BLOOD TEST: CPT

## 2017-04-21 PROCEDURE — 85025 COMPLETE CBC W/AUTO DIFF WBC: CPT | Performed by: HOSPITALIST

## 2017-04-21 PROCEDURE — 86140 C-REACTIVE PROTEIN: CPT | Performed by: INTERNAL MEDICINE

## 2017-04-21 PROCEDURE — 80074 ACUTE HEPATITIS PANEL: CPT | Performed by: HOSPITALIST

## 2017-04-21 PROCEDURE — 87798 DETECT AGENT NOS DNA AMP: CPT | Performed by: HOSPITALIST

## 2017-04-21 PROCEDURE — 87486 CHLMYD PNEUM DNA AMP PROBE: CPT | Performed by: HOSPITALIST

## 2017-04-21 NOTE — CERTIFICATION
**Certification    PHYSICIAN Certification of Need for Inpatient Hospitalization    Based on the his current state of illness, Joshua Carlosalejandro requires inpatient hospitalization for his sepsis.   This requires inpatient medical treatment and medically requir

## 2017-04-21 NOTE — PROGRESS NOTES
[facility] Hospitalist Progress Note     PCP: Jose C Madrid MD    CC:  Follow up    SUBJECTIVE:  Laying in bed, feels the same as yesterday. L lower back/flank pain improved.   No cp/sob/n/v.  NO dysuria, NO abdominal pain    OBJECTIVE:  Temp:  [97.6 °F (36.4 ° Rivaroxaban  20 mg Oral Nightly   • Alfuzosin HCl ER  10 mg Oral Daily with breakfast   • Sotalol HCl  120 mg Oral BID   • insulin aspart  1-5 Units Subcutaneous TID CC and HS   • latanoprost  1 drop Both Eyes Nightly   • piperacillin-tazobactam  4.5 g Int Valerio Omalley MD    [facility] Hospitalist  Pager: [phone]

## 2017-04-21 NOTE — PROGRESS NOTES
Multidisciplinary Discharge Rounds held 4/21/2017. Treatment team members present today include , , Charge Nurse,  Nurse, RT, PT and Pharmacy caring for [employer].      Other care providers present:    Patient Active Probl

## 2017-04-21 NOTE — PROGRESS NOTES
[facility]  Progress Note    Sheba Mitchell Patient Status:  Observation    1945 MRN [id number]   [facility] 5NW-A Attending Jesus Carlos, *   Hosp Day # 2 PCP Hermilo Pedersen MD     Subjective:    Patient reports improv

## 2017-04-21 NOTE — RESPIRATORY THERAPY NOTE
RENETTA - Equipment Use Daily Summary:  · Set Mode CFLEX  · Usage in hours: 8  · 90% Pressure (EPAP) level:   · 90% Insp Pressure (IPAP): 11  · AHI: 1  · Supplemental Oxygen:3  · Comments:

## 2017-04-21 NOTE — PROGRESS NOTES
[facility]  TEL: [phone]  FAX: [phone]    Lisandro Umana Patient Status:  Inpatient    1945 MRN [id number]   [facility] 5NW-A Attending Alayna Celeste, *   Hosp Day # 2 PCP Cain Cobos Specimen Information: Other from Nasopharyngeal swab      Urine Culture, Routine Once [[id number]] Collected: 04/19/17 2336     Order Status: Completed Lab Status: Preliminary result Updated: 04/21/17 1002     Specimen Information: Urine from Urine, clean c fevers persist w/o an obvious source  Case and plan d/w pt  Will follow.        Evie Joseph

## 2017-04-22 PROCEDURE — 85025 COMPLETE CBC W/AUTO DIFF WBC: CPT | Performed by: INTERNAL MEDICINE

## 2017-04-22 PROCEDURE — 87493 C DIFF AMPLIFIED PROBE: CPT | Performed by: HOSPITALIST

## 2017-04-22 PROCEDURE — 83735 ASSAY OF MAGNESIUM: CPT | Performed by: HOSPITALIST

## 2017-04-22 PROCEDURE — 82962 GLUCOSE BLOOD TEST: CPT

## 2017-04-22 PROCEDURE — 80053 COMPREHEN METABOLIC PANEL: CPT | Performed by: INTERNAL MEDICINE

## 2017-04-22 NOTE — PROGRESS NOTES
[facility] Hospitalist Progress Note     PCP: Sloane Aguilar MD    CC:  Follow up    SUBJECTIVE:  Pt sitting up in chair, afebrile overnight--though has been taking tylenol. No abdominal pain, no dysuria.   No cp/sob/n/v.    OBJECTIVE:  Temp:  [97.5 °F (36.4 ° 115*  116*  107*  168*  110*         Meds:     • aspirin  81 mg Oral Daily   • DilTIAZem HCl ER Coated Beads  240 mg Oral Daily   • finasteride  5 mg Oral Daily   • Pravastatin Sodium  20 mg Oral Nightly   • lisinopril  40 mg Oral Daily   • Rivaroxaban  20 IVF    **RENETTA-renetta protocol, no acute issues. monitor    **Morbid obesity-encourage weight loss, f/u with PCP      Questions/concerns were discussed with patient and/or family by bedside.       Thank Shubham Lay MD    [facility] Hospitalist  Pager:

## 2017-04-22 NOTE — RESPIRATORY THERAPY NOTE
RENETTA - Equipment Use Daily Summary:  · Set Mode CFLEX  · Usage in hours: 00:40  · 90% Pressure (EPAP) level:   · 90% Insp Pressure (IPAP): 11  · AHI: 1  · Supplemental Oxygen:3  · Comments:

## 2017-04-22 NOTE — PROGRESS NOTES
[facility]                INFECTIOUS DISEASE PROGRESS NOTE    Mamadou Ruiz Patient Status:  Inpatient    1945 MRN [id number]   [facility] 5NW-A Attending Attila Sheffield, [facility] Day # 3 PCP Precious Wallace MD     A Urine Culture, Routine Once [[id number]] Collected: 04/19/17 4706     Order Status: Completed Lab Status: Final result Updated: 04/22/17 0683     Specimen Information: Urine from Urine, clean catch       Urine Culture <10,000 CFU/ML Mixed gram positive jayson could represent infection within the kidney in the correct clinical    setting. Correlation with urinalysis is recommended.  The ureters and bladder are unremarkable.    ADRENALS:  Normal.  No mass or enlargement.     LIVER:  Normal.  No enlargement, atroph arterial disease (HCC)     High risk medication use     Increased BMI     Sensorineural hearing loss, bilateral     SNHL (sensorineural hearing loss)     Atherosclerosis of aortic arch (HCC)     RENETTA on CPAP     Bronchitis     Flank pain, acute     Fever, u

## 2017-04-22 NOTE — PLAN OF CARE
CARDIOVASCULAR - ADULT    • Maintains optimal cardiac output and hemodynamic stability Progressing        PAIN - ADULT    • Verbalizes/displays adequate comfort level or patient's stated pain goal Progressing        RESPIRATORY - ADULT    • Achieves optima

## 2017-04-23 ENCOUNTER — APPOINTMENT (OUTPATIENT)
Dept: CT IMAGING | Facility: HOSPITAL | Age: 72
DRG: 872 | End: 2017-04-23
Attending: INTERNAL MEDICINE
Payer: MEDICARE

## 2017-04-23 PROCEDURE — 82962 GLUCOSE BLOOD TEST: CPT

## 2017-04-23 PROCEDURE — 83735 ASSAY OF MAGNESIUM: CPT | Performed by: HOSPITALIST

## 2017-04-23 PROCEDURE — 85025 COMPLETE CBC W/AUTO DIFF WBC: CPT | Performed by: HOSPITALIST

## 2017-04-23 PROCEDURE — 74178 CT ABD&PLV WO CNTR FLWD CNTR: CPT

## 2017-04-23 PROCEDURE — 80053 COMPREHEN METABOLIC PANEL: CPT | Performed by: HOSPITALIST

## 2017-04-23 RX ORDER — LEVOFLOXACIN 750 MG/1
750 TABLET ORAL DAILY
Qty: 10 TABLET | Refills: 0 | Status: SHIPPED | OUTPATIENT
Start: 2017-04-23 | End: 2017-04-23

## 2017-04-23 RX ORDER — CEPHALEXIN 500 MG/1
500 CAPSULE ORAL 4 TIMES DAILY
Qty: 40 CAPSULE | Refills: 0 | Status: SHIPPED | OUTPATIENT
Start: 2017-04-23 | End: 2017-05-03

## 2017-04-23 RX ORDER — CALCIUM CARBONATE 200(500)MG
500 TABLET,CHEWABLE ORAL 2 TIMES DAILY PRN
Status: DISCONTINUED | OUTPATIENT
Start: 2017-04-23 | End: 2017-04-24

## 2017-04-23 RX ORDER — METRONIDAZOLE 500 MG/1
500 TABLET ORAL 3 TIMES DAILY
Qty: 30 TABLET | Refills: 0 | Status: SHIPPED | OUTPATIENT
Start: 2017-04-23 | End: 2017-05-03

## 2017-04-23 RX ORDER — ZOLPIDEM TARTRATE 5 MG/1
5 TABLET ORAL NIGHTLY PRN
Status: DISCONTINUED | OUTPATIENT
Start: 2017-04-23 | End: 2017-04-24

## 2017-04-23 NOTE — PROGRESS NOTES
[facility] Hospitalist Progress Note     PCP: Luz Taveras MD    CC:  Follow up    SUBJECTIVE:  Feels well, no complaints  Discussed LFTs and finding on repeat CT    OBJECTIVE:  Temp:  [97.5 °F (36.4 °C)-98.6 °F (37 °C)] 98 °F (36.7 °C)  Pulse:  [64-88] 88 240 mg Oral Daily   • finasteride  5 mg Oral Daily   • Pravastatin Sodium  20 mg Oral Nightly   • lisinopril  40 mg Oral Daily   • Rivaroxaban  20 mg Oral Nightly   • Alfuzosin HCl ER  10 mg Oral Daily with breakfast   • Sotalol HCl  120 mg Oral BID   • in closely    **HTN/HL-  bp meds ordered with holding parameters    **RENETTA-renetta protocol, no acute issues.  monitor    **Morbid obesity-encourage weight loss, f/u with PCP    Ruiz Morris MD  [facility] Hospitalist  Pager: [phone]

## 2017-04-23 NOTE — RESPIRATORY THERAPY NOTE
RENETTA - Equipment Use Daily Summary:  · Set Mode CFLEX  · Usage in hours: 8:54  · 90% Pressure (EPAP) level:   · 90% Insp Pressure (IPAP): 11  · AHI: 1  · Supplemental Oxygen:3  · Comments:

## 2017-04-23 NOTE — PROGRESS NOTES
[facility]                INFECTIOUS DISEASE PROGRESS NOTE    Govind Dubois Patient Status:  Inpatient    1945 MRN [id number]   [facility] 5NW-A Attending Jonn Le, [facility] Day # 4 PCP Alysa Hood MD     A arterial disease (HCC)     High risk medication use     Increased BMI     Sensorineural hearing loss, bilateral     SNHL (sensorineural hearing loss)     Atherosclerosis of aortic arch (HCC)     RENETTA on CPAP     Bronchitis     Flank pain, acute     Fever, u

## 2017-04-24 ENCOUNTER — APPOINTMENT (OUTPATIENT)
Dept: GENERAL RADIOLOGY | Facility: HOSPITAL | Age: 72
DRG: 872 | End: 2017-04-24
Attending: HOSPITALIST
Payer: MEDICARE

## 2017-04-24 VITALS
HEART RATE: 60 BPM | RESPIRATION RATE: 20 BRPM | BODY MASS INDEX: 38.1 KG/M2 | TEMPERATURE: 98 F | WEIGHT: 266.13 LBS | SYSTOLIC BLOOD PRESSURE: 136 MMHG | OXYGEN SATURATION: 96 % | DIASTOLIC BLOOD PRESSURE: 75 MMHG | HEIGHT: 70 IN

## 2017-04-24 PROCEDURE — 83550 IRON BINDING TEST: CPT | Performed by: INTERNAL MEDICINE

## 2017-04-24 PROCEDURE — 83540 ASSAY OF IRON: CPT | Performed by: INTERNAL MEDICINE

## 2017-04-24 PROCEDURE — 86038 ANTINUCLEAR ANTIBODIES: CPT | Performed by: INTERNAL MEDICINE

## 2017-04-24 PROCEDURE — 71020 XR CHEST PA + LAT CHEST (CPT=71020): CPT

## 2017-04-24 PROCEDURE — 83516 IMMUNOASSAY NONANTIBODY: CPT | Performed by: INTERNAL MEDICINE

## 2017-04-24 PROCEDURE — 80053 COMPREHEN METABOLIC PANEL: CPT | Performed by: HOSPITALIST

## 2017-04-24 PROCEDURE — 82962 GLUCOSE BLOOD TEST: CPT

## 2017-04-24 NOTE — CONSULTS
[facility]    Report of Gastroenterology Consultation    Georgean Apley Patient Status:  Inpatient    1945 MRN [id number]   [facility] 5NW-A Attending Talon Mueller,*   Hosp Day # 4 PCP Linda Hill MD     Date of A (TUMS) chewable tab 500 mg, 500 mg, Oral, BID PRN  •  acetaminophen (TYLENOL) tab 650 mg, 650 mg, Oral, Q6H PRN  •  ondansetron HCl (ZOFRAN) injection 4 mg, 4 mg, Intravenous, Q6H PRN  •  HYDROmorphone HCl PF (DILAUDID) 1 MG/ML injection 0.2 mg, 0.2 mg, In constipation, nausea, incontinence of stool, vomiting, black stools, get full quickly at meals, blood in stools, abdominal distention, jaundice, flatulence, vomiting blood, bloating, hernia, laxative use, food/milk intolerance, pain with bowel movement, he vision, eye disease, glasses or contacts, glaucoma. ENT: Denies nose or gums bleeding, mouth sores, bad breath or bad taste in mouth, hearing loss.   Physical Exam:    Blood pressure 129/78, pulse 78, temperature 97.4 °F (36.3 °C), temperature source Oral, includes the Dose Index Registry.      PATIENT STATED HISTORY:(As transcribed by Technologist)  Patient has lower back pain with acute fevers.       CONTRAST USED:  100 cc of Omnipaque 350      FINDINGS:     LIVER:  Diffuse low attenuation and heterogeneou the liver.      3.  Trace amount of perihepatic ascites. Assessment/Plan:    Fevers, rigors, L flank pain with possible L pyelonephritis. Better since Zosyn was started. UA negative    A fib/ RVR- rate controlled with Diltiazem    RENETTA/ HTN/ obesity.

## 2017-04-24 NOTE — RESPIRATORY THERAPY NOTE
RENETTA : EQUIPMENT USE: DAILY SUMMARY                                            SET MODE:  CPAP WITH CFLEX                                          USAGE IN HOURS: 9:13                                          90% EX

## 2017-04-24 NOTE — PROGRESS NOTES
[address] [place] [address]  TEL: [phone]  FAX: [phone]    Varinder Carlin Patient Status:  Inpatient    1945 MRN [id number]   [facility] 5NW-A Attending Jonna Goldman, [address] Day # 5 PCP Manish Mejia Procedure Component Value Units Date/Time     Blood Culture FREQ X 2 [[id number]] Collected: 04/19/17 2336     Order Status: Completed Lab Status: Preliminary result Updated: 04/24/17 0000     Specimen Information: Blood from Blood,peripheral       Blood Cu CHF, pneumonia, effusion, edema, or other acute process.       Dictated by: Mark Guerra MD on 4/24/2017 at 12:26   Approved by: Mark Guerra MD     CT ABDOMEN+PELVIS(ALL W+WO)(CPT=74178) Once [[id number]] Collected: 04/23/17 0858 Order Status: Completed atherosclerotic plaque in the aorta and iliac vessels without aneurysm. RETROPERITONEUM: Normal. No mass or adenopathy. BOWEL/MESENTERY: Epiploic fat necrosis noted about the sigmoid colon is stable. Evans Mary  No visible mass, obstruction, or bowel wall thickeni

## 2017-04-24 NOTE — PROGRESS NOTES
NURSING DISCHARGE NOTE    Discharged Home via Wheelchair. Accompanied by Support staff  Belongings Taken by patient/family. Patient stable upon discharge. IV removed. Discharge instructions and information given.  No further questions at this ti

## 2017-04-24 NOTE — PROGRESS NOTES
[facility]  Progress Note    Araceli Ramirez Patient Status:  Inpatient    1945 MRN [id number]   [facility] 5NW-A Attending Marco Jansen,*   Hosp Day # 5 PCP Love Ralph MD     Subjective:   Araceli Ramirez is a(n) 7 Units 1-5 Units Subcutaneous TID CC and HS   latanoprost (XALATAN) 0.005 % ophthalmic solution 1 drop 1 drop Both Eyes Nightly   Piperacillin Sod-Tazobactam So (ZOSYN) 4.5 g in dextrose 5 % 100 mL IVPB 4.5 g Intravenous Q8H   HYDROmorphone HCl PF (DILAUDID sleep apnea)     Peripheral arterial disease (HCC)     High risk medication use     Increased BMI     Sensorineural hearing loss, bilateral     SNHL (sensorineural hearing loss)     Atherosclerosis of aortic arch (HCC)     RENETTA on CPAP     Bronchitis     Fl

## 2017-04-24 NOTE — PLAN OF CARE
CARDIOVASCULAR - ADULT    • Maintains optimal cardiac output and hemodynamic stability Completed        PAIN - ADULT    • Verbalizes/displays adequate comfort level or patient's stated pain goal Completed        Patient/Family Goals    • Patient/Family Hollis

## 2017-06-09 ENCOUNTER — HOSPITAL ENCOUNTER (OUTPATIENT)
Dept: INTERVENTIONAL RADIOLOGY/VASCULAR | Facility: HOSPITAL | Age: 72
Discharge: HOME | End: 2017-06-09
Attending: INTERNAL MEDICINE | Admitting: INTERNAL MEDICINE
Payer: MEDICARE

## 2017-06-09 VITALS
OXYGEN SATURATION: 98 % | HEART RATE: 56 BPM | RESPIRATION RATE: 16 BRPM | TEMPERATURE: 97 F | DIASTOLIC BLOOD PRESSURE: 79 MMHG | SYSTOLIC BLOOD PRESSURE: 130 MMHG

## 2017-06-09 DIAGNOSIS — I48.19 PERSISTENT ATRIAL FIBRILLATION (HCC): ICD-10-CM

## 2017-06-09 PROCEDURE — 93005 ELECTROCARDIOGRAM TRACING: CPT

## 2017-06-09 PROCEDURE — 93010 ELECTROCARDIOGRAM REPORT: CPT | Performed by: INTERNAL MEDICINE

## 2017-06-09 PROCEDURE — 5A2204Z RESTORATION OF CARDIAC RHYTHM, SINGLE: ICD-10-PCS | Performed by: INTERNAL MEDICINE

## 2017-06-09 PROCEDURE — 92960 CARDIOVERSION ELECTRIC EXT: CPT

## 2017-06-09 PROCEDURE — 82962 GLUCOSE BLOOD TEST: CPT

## 2017-06-09 RX ORDER — SODIUM CHLORIDE 9 MG/ML
INJECTION, SOLUTION INTRAVENOUS CONTINUOUS
Status: DISCONTINUED | OUTPATIENT
Start: 2017-06-09 | End: 2017-06-09

## 2017-06-09 RX ADMIN — Medication 40 MG: at 12:51:00

## 2017-06-09 RX ADMIN — SODIUM CHLORIDE: 9 INJECTION, SOLUTION INTRAVENOUS at 11:30:00

## 2017-06-09 NOTE — H&P
ASSESSMENT:      - PAF last episode 6/06 now recurrence 3/2017 on sotalol  - increased weight 255  - DM  - HTN  - Hyperlipidemia  - RENETTA - CPAP  - LAFB    - Xarelto       PLAN:      - EKG normal no QT prolongation Sotalol proarrhythmia reviewed  - Risk of b never used smokeless tobacco. He reports that he does not drink alcohol or use illicit drugs.       Allergies:  No Known Allergies    Current Meds:    Current Outpatient Prescriptions:  ibuprofen 800 MG Oral Tab  Take 1 tablet (800 mg total) by mouth every Place 1 drop into both eyes nightly.    Disp:   Rfl:     Diclofenac Sodium (VOLTAREN) 1 % Transdermal Gel  Place onto the skin as needed.    Disp:   Rfl:               ROS:    Constitutional: negative for fevers  Eyes: negative for visual disturbance  Ears 04/15/2014  53    01/28/2014  50    ----------  CALCULATED LDL (mg/dL)    Date  Value    03/23/2017  51    08/01/2016  50    01/08/2016      Comment:    Unable to calculate LDL when TGL > 400.      ----------  TRIGLYCERIDES (mg/dL)    Date  Value    01/3

## 2017-06-09 NOTE — PROCEDURES
[facility]    PATIENT'S NAME: Kirk Owens   ATTENDING PHYSICIAN: Marjan Porras M.D. OPERATING PHYSICIAN: Marjan Porras M.D.    PATIENT ACCOUNT#:   [de-identified]    LOCATION:  [facility] 3 EDWP 10  MEDICAL RECORD #:   [id number]       DATE OF BIR Patient called requesting an order for shoe lifts. Order entered and faxed to [employer].  Patient notified this was completed.

## 2017-06-09 NOTE — PROGRESS NOTES
Successful cardioversion at bedside with Dr Jama Dangelo. Pt tolerated procedure well. See flow sheet. Reviewed d/c instructions with pt and wife, verbalizes understanding. Home via w/c with wife in stable condition without c/o.

## 2017-09-01 PROBLEM — E78.5 DYSLIPIDEMIA: Status: ACTIVE | Noted: 2017-09-01

## 2017-09-07 PROBLEM — M25.562 KNEE PAIN, BILATERAL: Status: ACTIVE | Noted: 2017-09-07

## 2017-09-07 PROBLEM — M25.561 KNEE PAIN, BILATERAL: Status: ACTIVE | Noted: 2017-09-07

## 2017-10-12 PROBLEM — E66.01 SEVERE OBESITY (BMI 35.0-35.9 WITH COMORBIDITY) (HCC): Status: ACTIVE | Noted: 2017-10-12

## 2017-10-12 PROBLEM — M25.561 KNEE PAIN, BILATERAL: Status: RESOLVED | Noted: 2017-09-07 | Resolved: 2017-10-12

## 2017-10-12 PROBLEM — J40 BRONCHITIS: Status: RESOLVED | Noted: 2017-02-16 | Resolved: 2017-10-12

## 2017-10-12 PROBLEM — R50.9 FEVER, UNSPECIFIED FEVER CAUSE: Status: RESOLVED | Noted: 2017-04-20 | Resolved: 2017-10-12

## 2017-10-12 PROBLEM — R10.9 FLANK PAIN, ACUTE: Status: RESOLVED | Noted: 2017-04-20 | Resolved: 2017-10-12

## 2017-10-12 PROBLEM — M25.562 KNEE PAIN, BILATERAL: Status: RESOLVED | Noted: 2017-09-07 | Resolved: 2017-10-12

## 2017-10-16 PROBLEM — M54.42 LEFT-SIDED LOW BACK PAIN WITH SCIATICA: Status: ACTIVE | Noted: 2017-10-16

## 2018-02-23 PROCEDURE — 82024 ASSAY OF ACTH: CPT | Performed by: INTERNAL MEDICINE

## 2018-02-23 PROCEDURE — 84140 ASSAY OF PREGNENOLONE: CPT | Performed by: INTERNAL MEDICINE

## 2018-02-23 PROCEDURE — 82627 DEHYDROEPIANDROSTERONE: CPT | Performed by: INTERNAL MEDICINE

## 2018-02-23 PROCEDURE — 84402 ASSAY OF FREE TESTOSTERONE: CPT | Performed by: INTERNAL MEDICINE

## 2018-02-23 PROCEDURE — 84403 ASSAY OF TOTAL TESTOSTERONE: CPT | Performed by: INTERNAL MEDICINE

## 2018-02-23 PROCEDURE — 82533 TOTAL CORTISOL: CPT | Performed by: INTERNAL MEDICINE

## 2018-04-26 ENCOUNTER — HOSPITAL ENCOUNTER (EMERGENCY)
Age: 73
Discharge: HOME | End: 2018-04-26
Attending: EMERGENCY MEDICINE
Payer: MEDICARE

## 2018-04-26 ENCOUNTER — APPOINTMENT (OUTPATIENT)
Dept: GENERAL RADIOLOGY | Age: 73
End: 2018-04-26
Attending: EMERGENCY MEDICINE
Payer: MEDICARE

## 2018-04-26 VITALS
SYSTOLIC BLOOD PRESSURE: 133 MMHG | WEIGHT: 250 LBS | HEART RATE: 112 BPM | BODY MASS INDEX: 35.79 KG/M2 | TEMPERATURE: 101 F | HEIGHT: 70 IN | DIASTOLIC BLOOD PRESSURE: 74 MMHG | OXYGEN SATURATION: 96 % | RESPIRATION RATE: 20 BRPM

## 2018-04-26 DIAGNOSIS — J40 BRONCHITIS: Primary | ICD-10-CM

## 2018-04-26 DIAGNOSIS — R50.9 FEVER, UNSPECIFIED FEVER CAUSE: ICD-10-CM

## 2018-04-26 PROCEDURE — 99284 EMERGENCY DEPT VISIT MOD MDM: CPT

## 2018-04-26 PROCEDURE — 71045 X-RAY EXAM CHEST 1 VIEW: CPT | Performed by: EMERGENCY MEDICINE

## 2018-04-26 PROCEDURE — 81003 URINALYSIS AUTO W/O SCOPE: CPT | Performed by: EMERGENCY MEDICINE

## 2018-04-26 PROCEDURE — 87581 M.PNEUMON DNA AMP PROBE: CPT | Performed by: EMERGENCY MEDICINE

## 2018-04-26 PROCEDURE — 87486 CHLMYD PNEUM DNA AMP PROBE: CPT | Performed by: EMERGENCY MEDICINE

## 2018-04-26 PROCEDURE — 87040 BLOOD CULTURE FOR BACTERIA: CPT | Performed by: EMERGENCY MEDICINE

## 2018-04-26 PROCEDURE — 85025 COMPLETE CBC W/AUTO DIFF WBC: CPT | Performed by: EMERGENCY MEDICINE

## 2018-04-26 PROCEDURE — 36415 COLL VENOUS BLD VENIPUNCTURE: CPT

## 2018-04-26 PROCEDURE — 87798 DETECT AGENT NOS DNA AMP: CPT | Performed by: EMERGENCY MEDICINE

## 2018-04-26 PROCEDURE — 80053 COMPREHEN METABOLIC PANEL: CPT | Performed by: EMERGENCY MEDICINE

## 2018-04-26 PROCEDURE — 87999 UNLISTED MICROBIOLOGY PX: CPT

## 2018-04-26 PROCEDURE — 83605 ASSAY OF LACTIC ACID: CPT | Performed by: EMERGENCY MEDICINE

## 2018-04-26 PROCEDURE — 87633 RESP VIRUS 12-25 TARGETS: CPT | Performed by: EMERGENCY MEDICINE

## 2018-04-26 RX ORDER — AZITHROMYCIN 250 MG/1
500 TABLET, FILM COATED ORAL ONCE
Status: COMPLETED | OUTPATIENT
Start: 2018-04-26 | End: 2018-04-26

## 2018-04-26 RX ORDER — ACETAMINOPHEN 500 MG
1000 TABLET ORAL ONCE
Status: COMPLETED | OUTPATIENT
Start: 2018-04-26 | End: 2018-04-26

## 2018-04-26 RX ORDER — IBUPROFEN 600 MG/1
600 TABLET ORAL ONCE
Status: DISCONTINUED | OUTPATIENT
Start: 2018-04-26 | End: 2018-04-26

## 2018-04-26 RX ORDER — AZITHROMYCIN 250 MG/1
250 TABLET, FILM COATED ORAL DAILY
Qty: 6 TABLET | Refills: 0 | Status: SHIPPED | OUTPATIENT
Start: 2018-04-26 | End: 2018-05-01

## 2018-04-26 RX ORDER — OSELTAMIVIR PHOSPHATE 75 MG/1
75 CAPSULE ORAL 2 TIMES DAILY
Qty: 10 CAPSULE | Refills: 0 | Status: SHIPPED | OUTPATIENT
Start: 2018-04-26 | End: 2018-05-01

## 2018-04-27 NOTE — ED PROVIDER NOTES
Patient Seen in: [facility] Emergency Department In [place]    History   Patient presents with:  Fever (infectious)    Stated Complaint: fever/chills    HPI    28-year-old male comes the hospital to complaint of having difficulty body aches and chills as reviewed and negative except as noted above.     Physical Exam   ED Triage Vitals [04/26/18 1952]  BP: 127/85  Pulse: 92  Resp: 20  Temp: (!) 103.1 °F (39.5 °C)  Temp src: Oral  SpO2: 97 %  O2 Device: None (Room air)    Current:/81   Pulse 102   Temp 2129  ------------------------------------------------------------     Xr Chest Ap Portable  (cpt=71045)    Result Date: 4/26/2018  PROCEDURE:  XR CHEST AP PORTABLE  (CPT=71045)  TECHNIQUE:  AP chest radiograph was obtained.   COMPARISON:  CHRISTINE XR CHEST

## 2018-04-28 ENCOUNTER — HOSPITAL ENCOUNTER (EMERGENCY)
Facility: HOSPITAL | Age: 73
Discharge: HOME | End: 2018-04-28
Attending: STUDENT IN AN ORGANIZED HEALTH CARE EDUCATION/TRAINING PROGRAM
Payer: MEDICARE

## 2018-04-28 ENCOUNTER — APPOINTMENT (OUTPATIENT)
Dept: CT IMAGING | Facility: HOSPITAL | Age: 73
End: 2018-04-28
Attending: STUDENT IN AN ORGANIZED HEALTH CARE EDUCATION/TRAINING PROGRAM
Payer: MEDICARE

## 2018-04-28 VITALS
TEMPERATURE: 99 F | SYSTOLIC BLOOD PRESSURE: 125 MMHG | WEIGHT: 250 LBS | RESPIRATION RATE: 16 BRPM | OXYGEN SATURATION: 96 % | BODY MASS INDEX: 35.79 KG/M2 | HEART RATE: 82 BPM | DIASTOLIC BLOOD PRESSURE: 71 MMHG | HEIGHT: 70 IN

## 2018-04-28 DIAGNOSIS — R50.9 FEVER, UNSPECIFIED FEVER CAUSE: Primary | ICD-10-CM

## 2018-04-28 PROCEDURE — 85610 PROTHROMBIN TIME: CPT | Performed by: STUDENT IN AN ORGANIZED HEALTH CARE EDUCATION/TRAINING PROGRAM

## 2018-04-28 PROCEDURE — 80053 COMPREHEN METABOLIC PANEL: CPT | Performed by: STUDENT IN AN ORGANIZED HEALTH CARE EDUCATION/TRAINING PROGRAM

## 2018-04-28 PROCEDURE — 96360 HYDRATION IV INFUSION INIT: CPT

## 2018-04-28 PROCEDURE — 85730 THROMBOPLASTIN TIME PARTIAL: CPT | Performed by: STUDENT IN AN ORGANIZED HEALTH CARE EDUCATION/TRAINING PROGRAM

## 2018-04-28 PROCEDURE — 81003 URINALYSIS AUTO W/O SCOPE: CPT | Performed by: STUDENT IN AN ORGANIZED HEALTH CARE EDUCATION/TRAINING PROGRAM

## 2018-04-28 PROCEDURE — 83605 ASSAY OF LACTIC ACID: CPT | Performed by: STUDENT IN AN ORGANIZED HEALTH CARE EDUCATION/TRAINING PROGRAM

## 2018-04-28 PROCEDURE — 83690 ASSAY OF LIPASE: CPT | Performed by: STUDENT IN AN ORGANIZED HEALTH CARE EDUCATION/TRAINING PROGRAM

## 2018-04-28 PROCEDURE — 99285 EMERGENCY DEPT VISIT HI MDM: CPT

## 2018-04-28 PROCEDURE — 99284 EMERGENCY DEPT VISIT MOD MDM: CPT

## 2018-04-28 PROCEDURE — 85025 COMPLETE CBC W/AUTO DIFF WBC: CPT | Performed by: STUDENT IN AN ORGANIZED HEALTH CARE EDUCATION/TRAINING PROGRAM

## 2018-04-28 PROCEDURE — 74176 CT ABD & PELVIS W/O CONTRAST: CPT | Performed by: STUDENT IN AN ORGANIZED HEALTH CARE EDUCATION/TRAINING PROGRAM

## 2018-04-28 RX ORDER — ACETAMINOPHEN 500 MG
1000 TABLET ORAL ONCE
Status: DISCONTINUED | OUTPATIENT
Start: 2018-04-28 | End: 2018-04-28

## 2018-04-28 NOTE — ED PROVIDER NOTES
Patient Seen in: [facility] Emergency Department    History   Patient presents with:  Fever (infectious)  Abdomen/Flank Pain (GI/)    Stated Complaint: Flu symptoms    HPI    Patient is a 60-year-old male presenting emergency department reporting Packs/day: 0.00      Years: 0.00         Types: Cigars     Quit date: 11/30/2013  Smokeless tobacco: Never Used                      Comment: cigar 1 wk; quit cigs 1986  Alcohol use:  No               Com Abnormal; Notable for the following:     PT 27.4 (*)     INR 2.45 (*)     All other components within normal limits   PTT, ACTIVATED - Abnormal; Notable for the following:     PTT 45.2 (*)     All other components within normal limits   CBC W/ DIFFERENTIAL

## 2018-04-28 NOTE — ED INITIAL ASSESSMENT (HPI)
Pt returned to ed cc fever and chills since Thursday afternoon- seen at [facility] and still taking tamiflu and zithromax. Last tylenol at 0300 today.   Denies n/v/d   Oral temp at bs 102.0  Diaphoretic +

## 2018-04-28 NOTE — ED PROVIDER NOTES
Ct Abdomen+pelvis(cpt=74176)    Result Date: 4/28/2018  CONCLUSION:  1. The study is limited due the lack of IV contrast. 2.  Moderate distention of the bladder, moderately enlarged prostate.  3.  Stable small focus measuring 1.2 cm of epiploic appendagiti

## 2018-04-28 NOTE — ED NOTES
Pt resting comfortably and completed most of oral contrast. Pt updated on urine spec orders and instructed to call.

## 2018-08-21 PROBLEM — N40.1 BENIGN PROSTATIC HYPERPLASIA WITH URINARY HESITANCY: Status: ACTIVE | Noted: 2018-08-21

## 2018-08-21 PROBLEM — R39.11 BENIGN PROSTATIC HYPERPLASIA WITH URINARY HESITANCY: Status: ACTIVE | Noted: 2018-08-21

## 2018-08-21 PROBLEM — E66.01 SEVERE OBESITY (BMI 35.0-35.9 WITH COMORBIDITY) (HCC): Status: RESOLVED | Noted: 2017-10-12 | Resolved: 2018-08-21

## 2018-09-13 PROBLEM — Z79.01 ON RIVAROXABAN THERAPY: Status: ACTIVE | Noted: 2018-09-13

## 2018-10-04 PROCEDURE — 88305 TISSUE EXAM BY PATHOLOGIST: CPT | Performed by: INTERNAL MEDICINE

## 2018-12-19 ENCOUNTER — OFFICE VISIT (OUTPATIENT)
Dept: NUTRITION | Facility: HOSPITAL | Age: 73
End: 2018-12-19
Attending: INTERNAL MEDICINE
Payer: MEDICARE

## 2018-12-19 PROCEDURE — 97802 MEDICAL NUTRITION INDIV IN: CPT

## 2018-12-19 NOTE — PROGRESS NOTES
ADULT INITIAL OUTPATIENT NUTRITION CONSULTATION    Nutrition Assessment    Medical Diagnosis: Obesity    PMH: HTN, Pre-DM, Dyslipidemia    Client Hx: 75year old male    Meds: noted, including Metformin, Lovastatin, MVI, and [place] 3    Labs (8/24/18):   HG of  unhealthy food choices. Discussed having a \"Lifestyle\" plan to better overall health and increase activity. Reviewed low fat dairy, lean meats, low sodium, whole grains, and high protein foods to choose.  Suggested pt to not drop below 30% carbs (135g

## 2019-02-09 PROBLEM — I47.2 VENTRICULAR TACHYCARDIA (HCC): Status: ACTIVE | Noted: 2019-02-09

## 2019-02-09 PROBLEM — E78.2 MIXED HYPERLIPIDEMIA: Status: ACTIVE | Noted: 2019-02-09

## 2019-02-09 PROBLEM — I47.20 VENTRICULAR TACHYCARDIA (HCC): Status: ACTIVE | Noted: 2019-02-09

## 2019-02-09 PROBLEM — Z95.810 ICD (IMPLANTABLE CARDIOVERTER-DEFIBRILLATOR) IN PLACE: Status: ACTIVE | Noted: 2019-02-09

## 2019-02-09 PROBLEM — R55 SYNCOPE: Status: ACTIVE | Noted: 2019-02-09

## 2019-03-08 ENCOUNTER — HOSPITAL ENCOUNTER (EMERGENCY)
Facility: HOSPITAL | Age: 74
Discharge: HOME | End: 2019-03-09
Attending: EMERGENCY MEDICINE
Payer: MEDICARE

## 2019-03-08 ENCOUNTER — APPOINTMENT (OUTPATIENT)
Dept: GENERAL RADIOLOGY | Facility: HOSPITAL | Age: 74
End: 2019-03-08
Attending: EMERGENCY MEDICINE
Payer: MEDICARE

## 2019-03-08 VITALS
HEART RATE: 84 BPM | HEIGHT: 70 IN | SYSTOLIC BLOOD PRESSURE: 134 MMHG | DIASTOLIC BLOOD PRESSURE: 93 MMHG | OXYGEN SATURATION: 97 % | BODY MASS INDEX: 34.36 KG/M2 | TEMPERATURE: 97 F | WEIGHT: 240 LBS | RESPIRATION RATE: 16 BRPM

## 2019-03-08 DIAGNOSIS — R07.89 CHEST PAIN, ATYPICAL: Primary | ICD-10-CM

## 2019-03-08 LAB
ALBUMIN SERPL-MCNC: 4.2 G/DL (ref 3.4–5)
ALBUMIN/GLOB SERPL: 1.3 {RATIO} (ref 1–2)
ALP LIVER SERPL-CCNC: 63 U/L (ref 45–117)
ALT SERPL-CCNC: 25 U/L (ref 16–61)
ANION GAP SERPL CALC-SCNC: 7 MMOL/L (ref 0–18)
APTT PPP: 35.2 SECONDS (ref 26.1–34.6)
AST SERPL-CCNC: 16 U/L (ref 15–37)
BASOPHILS # BLD AUTO: 0.06 X10(3) UL (ref 0–0.2)
BASOPHILS NFR BLD AUTO: 0.8 %
BILIRUB SERPL-MCNC: 0.4 MG/DL (ref 0.1–2)
BUN BLD-MCNC: 21 MG/DL (ref 7–18)
BUN/CREAT SERPL: 20.6 (ref 10–20)
CALCIUM BLD-MCNC: 8.8 MG/DL (ref 8.5–10.1)
CHLORIDE SERPL-SCNC: 107 MMOL/L (ref 98–107)
CHOLEST SMN-MCNC: 139 MG/DL (ref ?–200)
CO2 SERPL-SCNC: 25 MMOL/L (ref 21–32)
CREAT BLD-MCNC: 1.02 MG/DL (ref 0.7–1.3)
DEPRECATED RDW RBC AUTO: 48.4 FL (ref 35.1–46.3)
EOSINOPHIL # BLD AUTO: 0.29 X10(3) UL (ref 0–0.7)
EOSINOPHIL NFR BLD AUTO: 3.9 %
ERYTHROCYTE [DISTWIDTH] IN BLOOD BY AUTOMATED COUNT: 15.4 % (ref 11–15)
GLOBULIN PLAS-MCNC: 3.3 G/DL (ref 2.8–4.4)
GLUCOSE BLD-MCNC: 88 MG/DL (ref 70–99)
HCT VFR BLD AUTO: 44.4 % (ref 39–53)
HDLC SERPL-MCNC: 39 MG/DL (ref 40–59)
HGB BLD-MCNC: 14.6 G/DL (ref 13–17.5)
IMM GRANULOCYTES # BLD AUTO: 0.03 X10(3) UL (ref 0–1)
IMM GRANULOCYTES NFR BLD: 0.4 %
INR BLD: 1.17 (ref 0.9–1.1)
LDLC SERPL CALC-MCNC: 29 MG/DL (ref ?–100)
LYMPHOCYTES # BLD AUTO: 2.46 X10(3) UL (ref 1–4)
LYMPHOCYTES NFR BLD AUTO: 33.5 %
M PROTEIN MFR SERPL ELPH: 7.5 G/DL (ref 6.4–8.2)
MCH RBC QN AUTO: 28.3 PG (ref 26–34)
MCHC RBC AUTO-ENTMCNC: 32.9 G/DL (ref 31–37)
MCV RBC AUTO: 86 FL (ref 80–100)
MONOCYTES # BLD AUTO: 0.75 X10(3) UL (ref 0.1–1)
MONOCYTES NFR BLD AUTO: 10.2 %
NEUTROPHILS # BLD AUTO: 3.76 X10 (3) UL (ref 1.5–7.7)
NEUTROPHILS # BLD AUTO: 3.76 X10(3) UL (ref 1.5–7.7)
NEUTROPHILS NFR BLD AUTO: 51.2 %
NONHDLC SERPL-MCNC: 100 MG/DL (ref ?–130)
OSMOLALITY SERPL CALC.SUM OF ELEC: 290 MOSM/KG (ref 275–295)
PLATELET # BLD AUTO: 204 10(3)UL (ref 150–450)
POTASSIUM SERPL-SCNC: 4.3 MMOL/L (ref 3.5–5.1)
PSA SERPL DL<=0.01 NG/ML-MCNC: 15.4 SECONDS (ref 12.5–14.7)
RBC # BLD AUTO: 5.16 X10(6)UL (ref 3.8–5.8)
SODIUM SERPL-SCNC: 139 MMOL/L (ref 136–145)
TRIGL SERPL-MCNC: 357 MG/DL (ref 30–149)
TROPONIN I SERPL-MCNC: <0.045 NG/ML (ref ?–0.04)
TROPONIN I SERPL-MCNC: <0.045 NG/ML (ref ?–0.04)
VLDLC SERPL CALC-MCNC: 71 MG/DL (ref 0–30)
WBC # BLD AUTO: 7.4 X10(3) UL (ref 4–11)

## 2019-03-08 PROCEDURE — 99285 EMERGENCY DEPT VISIT HI MDM: CPT | Performed by: EMERGENCY MEDICINE

## 2019-03-08 PROCEDURE — 80061 LIPID PANEL: CPT | Performed by: EMERGENCY MEDICINE

## 2019-03-08 PROCEDURE — 93005 ELECTROCARDIOGRAM TRACING: CPT

## 2019-03-08 PROCEDURE — 85730 THROMBOPLASTIN TIME PARTIAL: CPT | Performed by: EMERGENCY MEDICINE

## 2019-03-08 PROCEDURE — 71045 X-RAY EXAM CHEST 1 VIEW: CPT | Performed by: EMERGENCY MEDICINE

## 2019-03-08 PROCEDURE — 85610 PROTHROMBIN TIME: CPT | Performed by: EMERGENCY MEDICINE

## 2019-03-08 PROCEDURE — 85025 COMPLETE CBC W/AUTO DIFF WBC: CPT | Performed by: EMERGENCY MEDICINE

## 2019-03-08 PROCEDURE — 93010 ELECTROCARDIOGRAM REPORT: CPT | Performed by: EMERGENCY MEDICINE

## 2019-03-08 PROCEDURE — 84484 ASSAY OF TROPONIN QUANT: CPT | Performed by: EMERGENCY MEDICINE

## 2019-03-08 PROCEDURE — 80053 COMPREHEN METABOLIC PANEL: CPT | Performed by: EMERGENCY MEDICINE

## 2019-03-08 PROCEDURE — 36415 COLL VENOUS BLD VENIPUNCTURE: CPT | Performed by: EMERGENCY MEDICINE

## 2019-03-08 RX ORDER — ASPIRIN 81 MG/1
324 TABLET, CHEWABLE ORAL ONCE
Status: COMPLETED | OUTPATIENT
Start: 2019-03-08 | End: 2019-03-08

## 2019-03-09 LAB
ATRIAL RATE: 71 BPM
Q-T INTERVAL: 390 MS
QRS DURATION: 112 MS
QTC CALCULATION (BEZET): 479 MS
R AXIS: -45 DEGREES
T AXIS: 42 DEGREES
VENTRICULAR RATE: 91 BPM

## 2019-03-09 NOTE — ED INITIAL ASSESSMENT (HPI)
Patient presents with chest burning. He states he was sitting at a concert when he started feeling warm like he was going to pass out. Recent history in January of defibrillator placement following multiple \"black out\" episodes attributed to vtach.  He de

## 2019-03-09 NOTE — ED PROVIDER NOTES
Patient Seen in: [facility] Emergency Department    History   Patient presents with:  Chest Pain Angina (cardiovascular)    Stated Complaint: chest pain    HPI    Patient is a pleasant 78-year-old male, with chronic atrial fibrillation, status post comment: cigar 1 wk; quit cigs 1986    Alcohol use: No      Alcohol/week: 1.0 oz      Types: 2 Standard drinks or equivalent per week      Comment: socially    Drug use: No      Review of Systems    Positive for stated complaint: chest pain  Other systems components within normal limits   PROTHROMBIN TIME (PT) - Abnormal; Notable for the following components:    PT 15.4 (*)     INR 1.17 (*)     All other components within normal limits   PTT, ACTIVATED - Abnormal; Notable for the following components:    PT Dictated by: Charity Garcia MD on 3/08/2019 at 21:44     Approved by: Charity Garcia MD              MDM     Patient was placed on a cart, an IV was established, and blood was drawn and sent to the laboratory for further evaluation.  The patient was attac

## 2019-06-27 PROBLEM — B35.1 ONYCHOMYCOSIS: Status: ACTIVE | Noted: 2019-06-27

## 2019-07-10 PROCEDURE — 81003 URINALYSIS AUTO W/O SCOPE: CPT | Performed by: UROLOGY

## 2019-07-11 ENCOUNTER — HOSPITAL ENCOUNTER (OUTPATIENT)
Dept: MRI IMAGING | Facility: HOSPITAL | Age: 74
Discharge: HOME | End: 2019-07-11
Attending: ORTHOPAEDIC SURGERY
Payer: MEDICARE

## 2019-07-11 DIAGNOSIS — M12.9 ARTHROPATHY: ICD-10-CM

## 2019-07-11 PROCEDURE — 73721 MRI JNT OF LWR EXTRE W/O DYE: CPT | Performed by: ORTHOPAEDIC SURGERY

## 2019-08-23 PROBLEM — Z87.898 H/O SYNCOPE: Status: ACTIVE | Noted: 2019-08-23

## 2019-08-23 PROBLEM — I73.9 SMALL VESSEL DISEASE (HCC): Status: ACTIVE | Noted: 2019-08-23

## 2019-08-23 PROBLEM — I25.10 CORONARY ARTERY DISEASE INVOLVING NATIVE CORONARY ARTERY OF NATIVE HEART WITHOUT ANGINA PECTORIS: Status: ACTIVE | Noted: 2019-08-23

## 2020-02-18 ENCOUNTER — APPOINTMENT (OUTPATIENT)
Dept: GENERAL RADIOLOGY | Age: 75
End: 2020-02-18
Attending: EMERGENCY MEDICINE
Payer: MEDICARE

## 2020-02-18 ENCOUNTER — HOSPITAL ENCOUNTER (EMERGENCY)
Age: 75
Discharge: HOME | End: 2020-02-18
Attending: EMERGENCY MEDICINE
Payer: MEDICARE

## 2020-02-18 VITALS
HEART RATE: 91 BPM | DIASTOLIC BLOOD PRESSURE: 66 MMHG | OXYGEN SATURATION: 97 % | HEIGHT: 70 IN | SYSTOLIC BLOOD PRESSURE: 123 MMHG | WEIGHT: 240 LBS | TEMPERATURE: 100 F | BODY MASS INDEX: 34.36 KG/M2 | RESPIRATION RATE: 18 BRPM

## 2020-02-18 DIAGNOSIS — R50.9 FEVER, UNSPECIFIED FEVER CAUSE: Primary | ICD-10-CM

## 2020-02-18 LAB
ALBUMIN SERPL-MCNC: 4 G/DL (ref 3.4–5)
ALBUMIN/GLOB SERPL: 1.1 {RATIO} (ref 1–2)
ALP LIVER SERPL-CCNC: 61 U/L (ref 45–117)
ALT SERPL-CCNC: 35 U/L (ref 16–61)
ANION GAP SERPL CALC-SCNC: 7 MMOL/L (ref 0–18)
AST SERPL-CCNC: 27 U/L (ref 15–37)
BASOPHILS # BLD AUTO: 0.04 X10(3) UL (ref 0–0.2)
BASOPHILS NFR BLD AUTO: 0.8 %
BILIRUB SERPL-MCNC: 0.5 MG/DL (ref 0.1–2)
BILIRUB UR QL STRIP.AUTO: NEGATIVE
BUN BLD-MCNC: 15 MG/DL (ref 7–18)
BUN/CREAT SERPL: 14.7 (ref 10–20)
CALCIUM BLD-MCNC: 9 MG/DL (ref 8.5–10.1)
CHLORIDE SERPL-SCNC: 103 MMOL/L (ref 98–112)
CO2 SERPL-SCNC: 26 MMOL/L (ref 21–32)
COLOR UR AUTO: YELLOW
CREAT BLD-MCNC: 1.02 MG/DL (ref 0.7–1.3)
DEPRECATED RDW RBC AUTO: 47.4 FL (ref 35.1–46.3)
EOSINOPHIL # BLD AUTO: 0.03 X10(3) UL (ref 0–0.7)
EOSINOPHIL NFR BLD AUTO: 0.6 %
ERYTHROCYTE [DISTWIDTH] IN BLOOD BY AUTOMATED COUNT: 15.9 % (ref 11–15)
GLOBULIN PLAS-MCNC: 3.7 G/DL (ref 2.8–4.4)
GLUCOSE BLD-MCNC: 141 MG/DL (ref 70–99)
GLUCOSE UR STRIP.AUTO-MCNC: NEGATIVE MG/DL
HCT VFR BLD AUTO: 47.7 % (ref 39–53)
HGB BLD-MCNC: 15.9 G/DL (ref 13–17.5)
IMM GRANULOCYTES # BLD AUTO: 0.02 X10(3) UL (ref 0–1)
IMM GRANULOCYTES NFR BLD: 0.4 %
KETONES UR STRIP.AUTO-MCNC: NEGATIVE MG/DL
LACTATE SERPL-SCNC: 1.4 MMOL/L (ref 0.4–2)
LEUKOCYTE ESTERASE UR QL STRIP.AUTO: NEGATIVE
LYMPHOCYTES # BLD AUTO: 0.51 X10(3) UL (ref 1–4)
LYMPHOCYTES NFR BLD AUTO: 10 %
M PROTEIN MFR SERPL ELPH: 7.7 G/DL (ref 6.4–8.2)
MCH RBC QN AUTO: 27.9 PG (ref 26–34)
MCHC RBC AUTO-ENTMCNC: 33.3 G/DL (ref 31–37)
MCV RBC AUTO: 83.8 FL (ref 80–100)
MONOCYTES # BLD AUTO: 0.25 X10(3) UL (ref 0.1–1)
MONOCYTES NFR BLD AUTO: 4.9 %
NEUTROPHILS # BLD AUTO: 4.24 X10 (3) UL (ref 1.5–7.7)
NEUTROPHILS # BLD AUTO: 4.24 X10(3) UL (ref 1.5–7.7)
NEUTROPHILS NFR BLD AUTO: 83.3 %
NITRITE UR QL STRIP.AUTO: NEGATIVE
OSMOLALITY SERPL CALC.SUM OF ELEC: 285 MOSM/KG (ref 275–295)
PH UR STRIP.AUTO: 7 [PH] (ref 4.5–8)
PLATELET # BLD AUTO: 155 10(3)UL (ref 150–450)
POCT INFLUENZA A: NEGATIVE
POCT INFLUENZA B: NEGATIVE
POTASSIUM SERPL-SCNC: 4.2 MMOL/L (ref 3.5–5.1)
PROT UR STRIP.AUTO-MCNC: NEGATIVE MG/DL
RBC # BLD AUTO: 5.69 X10(6)UL (ref 3.8–5.8)
SODIUM SERPL-SCNC: 136 MMOL/L (ref 136–145)
SP GR UR STRIP.AUTO: 1.01 (ref 1–1.03)
UROBILINOGEN UR STRIP.AUTO-MCNC: 0.2 MG/DL
WBC # BLD AUTO: 5.1 X10(3) UL (ref 4–11)

## 2020-02-18 PROCEDURE — 85025 COMPLETE CBC W/AUTO DIFF WBC: CPT | Performed by: EMERGENCY MEDICINE

## 2020-02-18 PROCEDURE — 87502 INFLUENZA DNA AMP PROBE: CPT | Performed by: EMERGENCY MEDICINE

## 2020-02-18 PROCEDURE — 80053 COMPREHEN METABOLIC PANEL: CPT | Performed by: EMERGENCY MEDICINE

## 2020-02-18 PROCEDURE — 99284 EMERGENCY DEPT VISIT MOD MDM: CPT

## 2020-02-18 PROCEDURE — 36415 COLL VENOUS BLD VENIPUNCTURE: CPT

## 2020-02-18 PROCEDURE — 81003 URINALYSIS AUTO W/O SCOPE: CPT | Performed by: EMERGENCY MEDICINE

## 2020-02-18 PROCEDURE — 81003 URINALYSIS AUTO W/O SCOPE: CPT

## 2020-02-18 PROCEDURE — 83605 ASSAY OF LACTIC ACID: CPT | Performed by: EMERGENCY MEDICINE

## 2020-02-18 PROCEDURE — 71046 X-RAY EXAM CHEST 2 VIEWS: CPT | Performed by: EMERGENCY MEDICINE

## 2020-02-18 PROCEDURE — 87040 BLOOD CULTURE FOR BACTERIA: CPT | Performed by: EMERGENCY MEDICINE

## 2020-02-18 RX ORDER — ACETAMINOPHEN 500 MG
1000 TABLET ORAL ONCE
Status: COMPLETED | OUTPATIENT
Start: 2020-02-18 | End: 2020-02-18

## 2020-02-19 NOTE — ED PROVIDER NOTES
Patient Seen in: [facility] Emergency Department In [place]      History   Patient presents with:  Fever    Stated Complaint: temp 102. Had stress test yesterday. no other c/o    HPI    Patient presents with a fever.   The patient was at the hospital yeste    • OTHER SURGICAL HISTORY  2019    Cystoscopy w/ Dr Pako Andrews History    Tobacco Use      Smoking status: Former Smoker        Types: Cigars        Quit date: 2013        Years since quittin.2      Smokeless tob DIFFERENTIAL - Abnormal; Notable for the following components:    RDW 15.9 (*)     RDW-SD 47.4 (*)     Lymphocyte Absolute 0.51 (*)     All other components within normal limits   LACTIC ACID, PLASMA - Normal   POCT FLU TEST - Normal    Narrative:      This given IV fluids. He had a persistent mild temperature elevation and was given Tylenol as well. MDM     The patient's work-up here does not suggest any acute bacterial infection.   The patient's symptoms are very vague at this point and he could possibly

## 2020-02-24 ENCOUNTER — HOSPITAL ENCOUNTER (OUTPATIENT)
Dept: CT IMAGING | Age: 75
Discharge: HOME | End: 2020-02-24
Attending: NURSE PRACTITIONER
Payer: MEDICARE

## 2020-02-24 DIAGNOSIS — R50.9 FEVER, UNKNOWN ORIGIN: ICD-10-CM

## 2020-02-24 PROCEDURE — 74177 CT ABD & PELVIS W/CONTRAST: CPT | Performed by: NURSE PRACTITIONER

## 2020-08-25 PROBLEM — D68.69 SECONDARY HYPERCOAGULABLE STATE (HCC): Status: ACTIVE | Noted: 2020-08-25

## 2020-09-30 PROBLEM — E13.9: Status: RESOLVED | Noted: 2020-09-30 | Resolved: 2020-09-30

## 2020-09-30 PROBLEM — E13.9: Status: ACTIVE | Noted: 2020-09-30

## 2020-10-05 ENCOUNTER — APPOINTMENT (OUTPATIENT)
Dept: LAB | Age: 75
End: 2020-10-05
Attending: OPHTHALMOLOGY
Payer: MEDICARE

## 2020-10-05 DIAGNOSIS — Z01.818 PRE-PROCEDURAL EXAMINATION: ICD-10-CM

## 2020-11-09 ENCOUNTER — WALK IN (OUTPATIENT)
Dept: URGENT CARE | Age: 75
End: 2020-11-09

## 2020-11-09 DIAGNOSIS — R05.9 COUGH: Primary | ICD-10-CM

## 2020-11-09 LAB — SARS-COV-2 AG RESP QL IA.RAPID: NOT DETECTED

## 2020-11-09 PROCEDURE — 87426 SARSCOV CORONAVIRUS AG IA: CPT | Performed by: INTERNAL MEDICINE

## 2020-11-09 PROCEDURE — 99214 OFFICE O/P EST MOD 30 MIN: CPT | Performed by: INTERNAL MEDICINE

## 2020-11-09 RX ORDER — LOVASTATIN 20 MG/1
20 TABLET ORAL
COMMUNITY
Start: 2020-06-19

## 2020-11-09 RX ORDER — SOTALOL HYDROCHLORIDE 120 MG/1
120 TABLET ORAL
COMMUNITY
Start: 2020-05-13

## 2020-11-09 RX ORDER — NIACIN 1000 MG/1
1000 TABLET, EXTENDED RELEASE ORAL
COMMUNITY
Start: 2019-12-02

## 2020-11-09 RX ORDER — LATANOPROST 50 UG/ML
1 SOLUTION/ DROPS OPHTHALMIC
COMMUNITY
Start: 2015-03-16

## 2020-11-09 ASSESSMENT — PAIN SCALES - GENERAL: PAINLEVEL: 0

## 2020-12-05 PROBLEM — K57.32 DIVERTICULITIS OF SIGMOID COLON: Status: ACTIVE | Noted: 2020-12-05

## 2021-01-04 ENCOUNTER — LAB ENCOUNTER (OUTPATIENT)
Dept: LAB | Age: 76
End: 2021-01-04
Attending: OPHTHALMOLOGY
Payer: MEDICARE

## 2021-01-04 DIAGNOSIS — Z01.818 PRE-PROCEDURAL EXAMINATION: ICD-10-CM

## 2021-01-05 LAB — SARS-COV-2 RNA RESP QL NAA+PROBE: NOT DETECTED

## 2021-03-09 ENCOUNTER — HOSPITAL ENCOUNTER (OUTPATIENT)
Dept: CT IMAGING | Age: 76
Discharge: HOME | End: 2021-03-09
Attending: INTERNAL MEDICINE
Payer: MEDICARE

## 2021-03-09 ENCOUNTER — LAB ENCOUNTER (OUTPATIENT)
Dept: LAB | Age: 76
End: 2021-03-09
Attending: INTERNAL MEDICINE
Payer: MEDICARE

## 2021-03-09 DIAGNOSIS — R10.9 ABDOMINAL PAIN, UNSPECIFIED ABDOMINAL LOCATION: ICD-10-CM

## 2021-03-09 LAB
BASOPHILS # BLD AUTO: 0.08 X10(3) UL (ref 0–0.2)
BASOPHILS NFR BLD AUTO: 0.8 %
BUN BLD-MCNC: 18 MG/DL (ref 7–18)
CREAT BLD-MCNC: 0.94 MG/DL
DEPRECATED RDW RBC AUTO: 49.3 FL (ref 35.1–46.3)
EOSINOPHIL # BLD AUTO: 0.23 X10(3) UL (ref 0–0.7)
EOSINOPHIL NFR BLD AUTO: 2.2 %
ERYTHROCYTE [DISTWIDTH] IN BLOOD BY AUTOMATED COUNT: 16.4 % (ref 11–15)
HCT VFR BLD AUTO: 45.5 %
HGB BLD-MCNC: 15.1 G/DL
IMM GRANULOCYTES # BLD AUTO: 0.02 X10(3) UL (ref 0–1)
IMM GRANULOCYTES NFR BLD: 0.2 %
LYMPHOCYTES # BLD AUTO: 2.36 X10(3) UL (ref 1–4)
LYMPHOCYTES NFR BLD AUTO: 23 %
MCH RBC QN AUTO: 27.8 PG (ref 26–34)
MCHC RBC AUTO-ENTMCNC: 33.2 G/DL (ref 31–37)
MCV RBC AUTO: 83.8 FL
MONOCYTES # BLD AUTO: 0.91 X10(3) UL (ref 0.1–1)
MONOCYTES NFR BLD AUTO: 8.9 %
NEUTROPHILS # BLD AUTO: 6.66 X10 (3) UL (ref 1.5–7.7)
NEUTROPHILS # BLD AUTO: 6.66 X10(3) UL (ref 1.5–7.7)
NEUTROPHILS NFR BLD AUTO: 64.9 %
PLATELET # BLD AUTO: 196 10(3)UL (ref 150–450)
RBC # BLD AUTO: 5.43 X10(6)UL
WBC # BLD AUTO: 10.3 X10(3) UL (ref 4–11)

## 2021-03-09 PROCEDURE — 36415 COLL VENOUS BLD VENIPUNCTURE: CPT

## 2021-03-09 PROCEDURE — 82565 ASSAY OF CREATININE: CPT

## 2021-03-09 PROCEDURE — 85025 COMPLETE CBC W/AUTO DIFF WBC: CPT

## 2021-03-09 PROCEDURE — 74177 CT ABD & PELVIS W/CONTRAST: CPT | Performed by: INTERNAL MEDICINE

## 2021-03-09 PROCEDURE — 84520 ASSAY OF UREA NITROGEN: CPT

## 2021-03-10 NOTE — PROGRESS NOTES
See TE 3/10/21  Patient verified receipt of phone message and medications ordered per Dr. Maritza Moran

## 2021-05-25 VITALS
SYSTOLIC BLOOD PRESSURE: 129 MMHG | OXYGEN SATURATION: 97 % | DIASTOLIC BLOOD PRESSURE: 82 MMHG | TEMPERATURE: 97.1 F | HEART RATE: 61 BPM | RESPIRATION RATE: 20 BRPM

## 2021-08-04 ENCOUNTER — EKG ENCOUNTER (OUTPATIENT)
Dept: LAB | Age: 76
End: 2021-08-04
Payer: MEDICARE

## 2021-08-04 DIAGNOSIS — N13.8 NODULAR PROSTATE WITH URINARY OBSTRUCTION: ICD-10-CM

## 2021-08-04 DIAGNOSIS — N40.3 NODULAR PROSTATE WITH URINARY OBSTRUCTION: ICD-10-CM

## 2021-08-04 LAB
ATRIAL RATE: 84 BPM
Q-T INTERVAL: 404 MS
QRS DURATION: 114 MS
QTC CALCULATION (BEZET): 474 MS
R AXIS: -51 DEGREES
T AXIS: 28 DEGREES
VENTRICULAR RATE: 83 BPM

## 2021-08-04 PROCEDURE — 93010 ELECTROCARDIOGRAM REPORT: CPT | Performed by: INTERNAL MEDICINE

## 2021-08-04 PROCEDURE — 93005 ELECTROCARDIOGRAM TRACING: CPT

## 2021-08-09 RX ORDER — ACETAMINOPHEN 500 MG
1000 TABLET ORAL ONCE
Status: CANCELLED | OUTPATIENT
Start: 2021-08-09 | End: 2021-08-09

## 2021-08-11 ENCOUNTER — HOSPITAL ENCOUNTER (OUTPATIENT)
Facility: HOSPITAL | Age: 76
Setting detail: HOSPITAL OUTPATIENT SURGERY
Discharge: HOME | End: 2021-08-11
Attending: UROLOGY | Admitting: UROLOGY
Payer: MEDICARE

## 2021-08-11 ENCOUNTER — TELEPHONE (OUTPATIENT)
Dept: SURGERY | Facility: HOSPITAL | Age: 76
End: 2021-08-11

## 2021-08-11 VITALS
HEIGHT: 70 IN | WEIGHT: 248.88 LBS | SYSTOLIC BLOOD PRESSURE: 154 MMHG | HEART RATE: 91 BPM | TEMPERATURE: 98 F | RESPIRATION RATE: 18 BRPM | BODY MASS INDEX: 35.63 KG/M2 | DIASTOLIC BLOOD PRESSURE: 90 MMHG | OXYGEN SATURATION: 100 %

## 2021-08-11 DIAGNOSIS — N40.1 HYPERTROPHY OF PROSTATE WITH URINARY OBSTRUCTION: ICD-10-CM

## 2021-08-11 DIAGNOSIS — N13.8 NODULAR PROSTATE WITH URINARY OBSTRUCTION: Primary | ICD-10-CM

## 2021-08-11 DIAGNOSIS — N40.3 NODULAR PROSTATE WITH URINARY OBSTRUCTION: Primary | ICD-10-CM

## 2021-08-11 DIAGNOSIS — N13.8 BPH WITH URINARY OBSTRUCTION: ICD-10-CM

## 2021-08-11 DIAGNOSIS — N40.1 HYPERTROPHY OF PROSTATE WITH URINARY OBSTRUCTION: Primary | ICD-10-CM

## 2021-08-11 DIAGNOSIS — N40.1 BPH WITH URINARY OBSTRUCTION: ICD-10-CM

## 2021-08-11 DIAGNOSIS — N13.8 HYPERTROPHY OF PROSTATE WITH URINARY OBSTRUCTION: Primary | ICD-10-CM

## 2021-08-11 DIAGNOSIS — N13.8 OTHER OBSTRUCTIVE AND REFLUX UROPATHY: ICD-10-CM

## 2021-08-11 DIAGNOSIS — N13.8 HYPERTROPHY OF PROSTATE WITH URINARY OBSTRUCTION: ICD-10-CM

## 2021-08-11 RX ORDER — CEFAZOLIN SODIUM/WATER 2 G/20 ML
SYRINGE (ML) INTRAVENOUS
Status: DISCONTINUED
Start: 2021-08-11 | End: 2021-08-11

## 2021-08-11 RX ORDER — METOCLOPRAMIDE HYDROCHLORIDE 5 MG/ML
10 INJECTION INTRAMUSCULAR; INTRAVENOUS AS NEEDED
Status: CANCELLED | OUTPATIENT
Start: 2021-08-11 | End: 2021-08-11

## 2021-08-11 RX ORDER — HYDROCODONE BITARTRATE AND ACETAMINOPHEN 5; 325 MG/1; MG/1
2 TABLET ORAL AS NEEDED
Status: CANCELLED | OUTPATIENT
Start: 2021-08-11

## 2021-08-11 RX ORDER — NALOXONE HYDROCHLORIDE 0.4 MG/ML
80 INJECTION, SOLUTION INTRAMUSCULAR; INTRAVENOUS; SUBCUTANEOUS AS NEEDED
Status: CANCELLED | OUTPATIENT
Start: 2021-08-11 | End: 2021-08-11

## 2021-08-11 RX ORDER — HYDROCODONE BITARTRATE AND ACETAMINOPHEN 5; 325 MG/1; MG/1
1 TABLET ORAL AS NEEDED
Status: CANCELLED | OUTPATIENT
Start: 2021-08-11

## 2021-08-11 RX ORDER — CEFAZOLIN SODIUM/WATER 2 G/20 ML
2 SYRINGE (ML) INTRAVENOUS ONCE
Status: DISCONTINUED | OUTPATIENT
Start: 2021-08-11 | End: 2021-09-03

## 2021-08-11 RX ORDER — DEXTROSE MONOHYDRATE 25 G/50ML
50 INJECTION, SOLUTION INTRAVENOUS
Status: CANCELLED | OUTPATIENT
Start: 2021-08-11

## 2021-08-11 RX ORDER — ACETAMINOPHEN 500 MG
1000 TABLET ORAL ONCE AS NEEDED
Status: CANCELLED | OUTPATIENT
Start: 2021-08-11 | End: 2021-08-11

## 2021-08-11 RX ORDER — ONDANSETRON 2 MG/ML
4 INJECTION INTRAMUSCULAR; INTRAVENOUS AS NEEDED
Status: CANCELLED | OUTPATIENT
Start: 2021-08-11 | End: 2021-08-11

## 2021-08-11 RX ORDER — SODIUM CHLORIDE, SODIUM LACTATE, POTASSIUM CHLORIDE, CALCIUM CHLORIDE 600; 310; 30; 20 MG/100ML; MG/100ML; MG/100ML; MG/100ML
INJECTION, SOLUTION INTRAVENOUS CONTINUOUS
Status: DISCONTINUED | OUTPATIENT
Start: 2021-08-11 | End: 2021-09-13

## 2021-08-11 RX ORDER — SODIUM CHLORIDE, SODIUM LACTATE, POTASSIUM CHLORIDE, CALCIUM CHLORIDE 600; 310; 30; 20 MG/100ML; MG/100ML; MG/100ML; MG/100ML
INJECTION, SOLUTION INTRAVENOUS CONTINUOUS
Status: CANCELLED | OUTPATIENT
Start: 2021-08-11

## 2021-08-11 RX ORDER — HYDROMORPHONE HYDROCHLORIDE 1 MG/ML
0.4 INJECTION, SOLUTION INTRAMUSCULAR; INTRAVENOUS; SUBCUTANEOUS EVERY 5 MIN PRN
Status: CANCELLED | OUTPATIENT
Start: 2021-08-11 | End: 2021-08-11
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
## 2021-08-11 NOTE — PROGRESS NOTES
Interviewed, examined pt, answered questions. No new history or findings. Cardiac  Clearance noted. Off apixaban  and ASA for OR. Spoke to wife(nurse) and pt  Lungs:Respirations not labored. Abd: soft, Non tender  Neuro: Alert.  Moving all extr  Procede as

## 2021-08-11 NOTE — H&P
Doyle Montano MD   Physician   Specialty:  Internal Medicine   Progress Notes      Signed   Encounter Date:  8/2/2021               Signed             Edna Sethi is a 67year old male who presents for a pre-operative physical exam. Patient is to hav statin.     Essential hypertension, benign  On quinapril 40 mg/d and sotalol 120 mg BID.  No headache.     CKD 3a  Creatine / EGFR 1.2 / 58.39 on 8/2/21. While this is lowest EGFR he did have EGFR 61 in 2020.   No change in urine pattern.     Chronic atria not been able to stick with the program.     H/O diverticulitis  He was seen on 11/30/20 for evaluation of 3 days of left lower quadrant pain.  He had CT abdomen/pelvis on 11/30/20 -- IMPRESSION:  1. Proximal sigmoid diverticulitis without complication.   2 times daily. 180 tablet 2   • Quinapril HCl 40 MG Oral Tab Take 1 tablet (40 mg total) by mouth 2 (two) times daily.  180 tablet 2   • DUTASTERIDE 0.5 MG Oral Cap TAKE 1 CAPSULE(0.5 MG) BY MOUTH EVERY NIGHT 90 capsule 3   • Acetaminophen 500 MG Oral Cap Ke MD;  Location: [facility] 21 1987-88   • OTHER SURGICAL HISTORY   07/19/2019     Cystoscopy w/ Dr Harrell [facility]             Family History   Problem Relation Age of Onset   • Cancer Mother     • Diabetes Mother         Social Hi deferred  MUSCULOSKELETAL: back is not tender,FROM of the back  EXTREMITIES: no cyanosis, clubbing or edema  NEURO: Oriented times three,cranial nerves are intact,motor and sensory are grossly intact     ASSESSMENT AND PLAN:   Patrick Gordon is a 67 year mg/d  --check LIPID 2/2021     RENETTA on CPAP  --good control with current CPAP  --CPM     Impaired fasting glucose  --stable on metformin 1000 mg/d  --diet and exercise  --check HbA1C 8/2022     Severe obesity (BMI 35.0-35.9 with comorbidity) ([address]  --discus

## 2021-08-16 NOTE — TELEPHONE ENCOUNTER
[facility] is aware of date and time for procedure @ [facility]. Phone [phone].

## 2021-08-16 NOTE — TELEPHONE ENCOUNTER
Future Appointments   Date Time Provider Stephen Shahida   8/31/2021 12:30 PM Tapan Rodriguez MD [facility]   9/13/2021  1:30 PM Marla Saavedra MD [facility]   9/16/2021  2:30 AM SP DEVICE REMOTE [place] [facility]   9/17/2021  1:40 PM Lenny Gu

## 2021-08-29 PROBLEM — Z87.19 H/O DIVERTICULITIS OF COLON: Status: ACTIVE | Noted: 2021-08-29

## 2021-08-29 PROBLEM — K57.32 DIVERTICULITIS OF SIGMOID COLON: Status: RESOLVED | Noted: 2020-12-05 | Resolved: 2021-08-29

## 2021-08-29 PROBLEM — N18.31 STAGE 3A CHRONIC KIDNEY DISEASE (HCC): Status: ACTIVE | Noted: 2021-08-29

## 2021-09-10 ENCOUNTER — LAB ENCOUNTER (OUTPATIENT)
Dept: LAB | Age: 76
End: 2021-09-10
Attending: UROLOGY
Payer: MEDICARE

## 2021-09-10 DIAGNOSIS — N13.8 OTHER OBSTRUCTIVE AND REFLUX UROPATHY: ICD-10-CM

## 2021-09-11 LAB — SARS-COV-2 RNA RESP QL NAA+PROBE: NOT DETECTED

## 2021-09-12 ENCOUNTER — ANESTHESIA EVENT (OUTPATIENT)
Dept: SURGERY | Facility: HOSPITAL | Age: 76
End: 2021-09-12
Payer: MEDICARE

## 2021-09-13 ENCOUNTER — ANESTHESIA (OUTPATIENT)
Dept: SURGERY | Facility: HOSPITAL | Age: 76
End: 2021-09-13
Payer: MEDICARE

## 2021-09-13 ENCOUNTER — HOSPITAL ENCOUNTER (OUTPATIENT)
Facility: HOSPITAL | Age: 76
Discharge: HOME | End: 2021-09-15
Attending: UROLOGY | Admitting: UROLOGY
Payer: MEDICARE

## 2021-09-13 DIAGNOSIS — N40.1 HYPERTROPHY OF PROSTATE WITH URINARY OBSTRUCTION: ICD-10-CM

## 2021-09-13 DIAGNOSIS — N13.8 HYPERTROPHY OF PROSTATE WITH URINARY OBSTRUCTION: ICD-10-CM

## 2021-09-13 DIAGNOSIS — N13.8 OTHER OBSTRUCTIVE AND REFLUX UROPATHY: ICD-10-CM

## 2021-09-13 LAB — GLUCOSE BLD-MCNC: 101 MG/DL (ref 70–99)

## 2021-09-13 PROCEDURE — 0VT08ZZ RESECTION OF PROSTATE, VIA NATURAL OR ARTIFICIAL OPENING ENDOSCOPIC: ICD-10-PCS | Performed by: UROLOGY

## 2021-09-13 PROCEDURE — 94660 CPAP INITIATION&MGMT: CPT

## 2021-09-13 PROCEDURE — 82962 GLUCOSE BLOOD TEST: CPT

## 2021-09-13 PROCEDURE — 88344 IMHCHEM/IMCYTCHM EA MLT ANTB: CPT | Performed by: UROLOGY

## 2021-09-13 PROCEDURE — 88305 TISSUE EXAM BY PATHOLOGIST: CPT | Performed by: UROLOGY

## 2021-09-13 RX ORDER — HEPARIN SODIUM 5000 [USP'U]/ML
5000 INJECTION, SOLUTION INTRAVENOUS; SUBCUTANEOUS EVERY 8 HOURS SCHEDULED
Status: DISCONTINUED | OUTPATIENT
Start: 2021-09-14 | End: 2021-09-15

## 2021-09-13 RX ORDER — SODIUM PHOSPHATE, DIBASIC AND SODIUM PHOSPHATE, MONOBASIC 7; 19 G/133ML; G/133ML
1 ENEMA RECTAL ONCE AS NEEDED
Status: DISCONTINUED | OUTPATIENT
Start: 2021-09-13 | End: 2021-09-15

## 2021-09-13 RX ORDER — LIDOCAINE HYDROCHLORIDE 10 MG/ML
INJECTION, SOLUTION EPIDURAL; INFILTRATION; INTRACAUDAL; PERINEURAL AS NEEDED
Status: DISCONTINUED | OUTPATIENT
Start: 2021-09-13 | End: 2021-09-13 | Stop reason: SURG

## 2021-09-13 RX ORDER — ROCURONIUM BROMIDE 10 MG/ML
INJECTION, SOLUTION INTRAVENOUS AS NEEDED
Status: DISCONTINUED | OUTPATIENT
Start: 2021-09-13 | End: 2021-09-13 | Stop reason: SURG

## 2021-09-13 RX ORDER — LISINOPRIL 5 MG/1
5 TABLET ORAL DAILY
Status: DISCONTINUED | OUTPATIENT
Start: 2021-09-13 | End: 2021-09-14

## 2021-09-13 RX ORDER — SODIUM CHLORIDE, SODIUM LACTATE, POTASSIUM CHLORIDE, CALCIUM CHLORIDE 600; 310; 30; 20 MG/100ML; MG/100ML; MG/100ML; MG/100ML
INJECTION, SOLUTION INTRAVENOUS CONTINUOUS
Status: DISCONTINUED | OUTPATIENT
Start: 2021-09-13 | End: 2021-09-15

## 2021-09-13 RX ORDER — HYDROMORPHONE HYDROCHLORIDE 1 MG/ML
0.4 INJECTION, SOLUTION INTRAMUSCULAR; INTRAVENOUS; SUBCUTANEOUS EVERY 5 MIN PRN
Status: DISCONTINUED | OUTPATIENT
Start: 2021-09-13 | End: 2021-09-13 | Stop reason: HOSPADM

## 2021-09-13 RX ORDER — SOTALOL HYDROCHLORIDE 120 MG/1
120 TABLET ORAL 2 TIMES DAILY
Status: DISCONTINUED | OUTPATIENT
Start: 2021-09-13 | End: 2021-09-15

## 2021-09-13 RX ORDER — SENNOSIDES 8.6 MG
17.2 TABLET ORAL NIGHTLY PRN
Status: DISCONTINUED | OUTPATIENT
Start: 2021-09-13 | End: 2021-09-15

## 2021-09-13 RX ORDER — CEFAZOLIN SODIUM/WATER 2 G/20 ML
2 SYRINGE (ML) INTRAVENOUS ONCE
Status: COMPLETED | OUTPATIENT
Start: 2021-09-13 | End: 2021-09-13

## 2021-09-13 RX ORDER — OXYBUTYNIN CHLORIDE 5 MG/1
5 TABLET ORAL EVERY 6 HOURS PRN
Status: DISCONTINUED | OUTPATIENT
Start: 2021-09-13 | End: 2021-09-15

## 2021-09-13 RX ORDER — DEXAMETHASONE SODIUM PHOSPHATE 4 MG/ML
VIAL (ML) INJECTION AS NEEDED
Status: DISCONTINUED | OUTPATIENT
Start: 2021-09-13 | End: 2021-09-13 | Stop reason: SURG

## 2021-09-13 RX ORDER — GLYCOPYRROLATE 0.2 MG/ML
INJECTION, SOLUTION INTRAMUSCULAR; INTRAVENOUS AS NEEDED
Status: DISCONTINUED | OUTPATIENT
Start: 2021-09-13 | End: 2021-09-13 | Stop reason: SURG

## 2021-09-13 RX ORDER — NIACIN 1000 MG/1
1000 TABLET, EXTENDED RELEASE ORAL NIGHTLY
Status: DISCONTINUED | OUTPATIENT
Start: 2021-09-13 | End: 2021-09-15

## 2021-09-13 RX ORDER — LABETALOL HYDROCHLORIDE 5 MG/ML
5 INJECTION, SOLUTION INTRAVENOUS EVERY 5 MIN PRN
Status: DISCONTINUED | OUTPATIENT
Start: 2021-09-13 | End: 2021-09-13 | Stop reason: HOSPADM

## 2021-09-13 RX ORDER — PHENAZOPYRIDINE HYDROCHLORIDE 100 MG/1
200 TABLET, FILM COATED ORAL 3 TIMES DAILY PRN
Status: DISCONTINUED | OUTPATIENT
Start: 2021-09-13 | End: 2021-09-15

## 2021-09-13 RX ORDER — HYDROMORPHONE HYDROCHLORIDE 1 MG/ML
INJECTION, SOLUTION INTRAMUSCULAR; INTRAVENOUS; SUBCUTANEOUS
Status: COMPLETED
Start: 2021-09-13 | End: 2021-09-13

## 2021-09-13 RX ORDER — NALOXONE HYDROCHLORIDE 0.4 MG/ML
80 INJECTION, SOLUTION INTRAMUSCULAR; INTRAVENOUS; SUBCUTANEOUS AS NEEDED
Status: DISCONTINUED | OUTPATIENT
Start: 2021-09-13 | End: 2021-09-13 | Stop reason: HOSPADM

## 2021-09-13 RX ORDER — HYDROCODONE BITARTRATE AND ACETAMINOPHEN 5; 325 MG/1; MG/1
2 TABLET ORAL AS NEEDED
Status: DISCONTINUED | OUTPATIENT
Start: 2021-09-13 | End: 2021-09-13 | Stop reason: HOSPADM

## 2021-09-13 RX ORDER — CEFAZOLIN SODIUM/WATER 2 G/20 ML
2 SYRINGE (ML) INTRAVENOUS EVERY 8 HOURS
Status: COMPLETED | OUTPATIENT
Start: 2021-09-14 | End: 2021-09-14

## 2021-09-13 RX ORDER — INSULIN ASPART 100 [IU]/ML
INJECTION, SOLUTION INTRAVENOUS; SUBCUTANEOUS ONCE
Status: DISCONTINUED | OUTPATIENT
Start: 2021-09-13 | End: 2021-09-13 | Stop reason: HOSPADM

## 2021-09-13 RX ORDER — BISACODYL 10 MG
10 SUPPOSITORY, RECTAL RECTAL
Status: DISCONTINUED | OUTPATIENT
Start: 2021-09-13 | End: 2021-09-15

## 2021-09-13 RX ORDER — SODIUM CHLORIDE, SODIUM LACTATE, POTASSIUM CHLORIDE, CALCIUM CHLORIDE 600; 310; 30; 20 MG/100ML; MG/100ML; MG/100ML; MG/100ML
INJECTION, SOLUTION INTRAVENOUS CONTINUOUS
Status: DISCONTINUED | OUTPATIENT
Start: 2021-09-13 | End: 2021-09-13 | Stop reason: HOSPADM

## 2021-09-13 RX ORDER — POLYETHYLENE GLYCOL 3350 17 G/17G
17 POWDER, FOR SOLUTION ORAL DAILY PRN
Status: DISCONTINUED | OUTPATIENT
Start: 2021-09-13 | End: 2021-09-15

## 2021-09-13 RX ORDER — PRAVASTATIN SODIUM 20 MG
20 TABLET ORAL NIGHTLY
Status: DISCONTINUED | OUTPATIENT
Start: 2021-09-13 | End: 2021-09-15

## 2021-09-13 RX ORDER — MELATONIN
3000 DAILY
Status: DISCONTINUED | OUTPATIENT
Start: 2021-09-13 | End: 2021-09-15

## 2021-09-13 RX ORDER — MELATONIN
3 NIGHTLY PRN
Status: DISCONTINUED | OUTPATIENT
Start: 2021-09-13 | End: 2021-09-15

## 2021-09-13 RX ORDER — ONDANSETRON 2 MG/ML
INJECTION INTRAMUSCULAR; INTRAVENOUS AS NEEDED
Status: DISCONTINUED | OUTPATIENT
Start: 2021-09-13 | End: 2021-09-13 | Stop reason: SURG

## 2021-09-13 RX ORDER — ONDANSETRON 2 MG/ML
4 INJECTION INTRAMUSCULAR; INTRAVENOUS AS NEEDED
Status: DISCONTINUED | OUTPATIENT
Start: 2021-09-13 | End: 2021-09-13 | Stop reason: HOSPADM

## 2021-09-13 RX ORDER — HYDROCODONE BITARTRATE AND ACETAMINOPHEN 5; 325 MG/1; MG/1
1 TABLET ORAL AS NEEDED
Status: DISCONTINUED | OUTPATIENT
Start: 2021-09-13 | End: 2021-09-13 | Stop reason: HOSPADM

## 2021-09-13 RX ORDER — ATROPA BELLADONNA AND OPIUM 16.2; 6 MG/1; MG/1
1 SUPPOSITORY RECTAL EVERY 6 HOURS PRN
Status: DISCONTINUED | OUTPATIENT
Start: 2021-09-13 | End: 2021-09-15

## 2021-09-13 RX ORDER — DOXEPIN HYDROCHLORIDE 50 MG/1
1 CAPSULE ORAL DAILY
Status: DISCONTINUED | OUTPATIENT
Start: 2021-09-13 | End: 2021-09-15

## 2021-09-13 RX ORDER — ACETAMINOPHEN 500 MG
1000 TABLET ORAL ONCE AS NEEDED
Status: DISCONTINUED | OUTPATIENT
Start: 2021-09-13 | End: 2021-09-13 | Stop reason: HOSPADM

## 2021-09-13 RX ORDER — NEOSTIGMINE METHYLSULFATE 1 MG/ML
INJECTION INTRAVENOUS AS NEEDED
Status: DISCONTINUED | OUTPATIENT
Start: 2021-09-13 | End: 2021-09-13 | Stop reason: SURG

## 2021-09-13 RX ORDER — ACETAMINOPHEN 500 MG
1000 TABLET ORAL ONCE
Status: DISCONTINUED | OUTPATIENT
Start: 2021-09-13 | End: 2021-09-13

## 2021-09-13 RX ORDER — PHENYLEPHRINE HCL 10 MG/ML
VIAL (ML) INJECTION AS NEEDED
Status: DISCONTINUED | OUTPATIENT
Start: 2021-09-13 | End: 2021-09-13 | Stop reason: SURG

## 2021-09-13 RX ORDER — DEXTROSE MONOHYDRATE 25 G/50ML
50 INJECTION, SOLUTION INTRAVENOUS
Status: DISCONTINUED | OUTPATIENT
Start: 2021-09-13 | End: 2021-09-13 | Stop reason: HOSPADM

## 2021-09-13 RX ORDER — LATANOPROST 50 UG/ML
1 SOLUTION/ DROPS OPHTHALMIC NIGHTLY
Status: DISCONTINUED | OUTPATIENT
Start: 2021-09-13 | End: 2021-09-15

## 2021-09-13 RX ORDER — ASCORBIC ACID 500 MG
1000 TABLET ORAL DAILY
Status: DISCONTINUED | OUTPATIENT
Start: 2021-09-13 | End: 2021-09-15

## 2021-09-13 RX ADMIN — PHENYLEPHRINE HCL 100 MCG: 10 MG/ML VIAL (ML) INJECTION at 14:03:00

## 2021-09-13 RX ADMIN — ROCURONIUM BROMIDE 10 MG: 10 INJECTION, SOLUTION INTRAVENOUS at 16:06:00

## 2021-09-13 RX ADMIN — ROCURONIUM BROMIDE 10 MG: 10 INJECTION, SOLUTION INTRAVENOUS at 16:32:00

## 2021-09-13 RX ADMIN — SODIUM CHLORIDE, SODIUM LACTATE, POTASSIUM CHLORIDE, CALCIUM CHLORIDE: 600; 310; 30; 20 INJECTION, SOLUTION INTRAVENOUS at 16:07:00

## 2021-09-13 RX ADMIN — ROCURONIUM BROMIDE 20 MG: 10 INJECTION, SOLUTION INTRAVENOUS at 15:15:00

## 2021-09-13 RX ADMIN — CEFAZOLIN SODIUM/WATER 2 G: 2 G/20 ML SYRINGE (ML) INTRAVENOUS at 17:51:00

## 2021-09-13 RX ADMIN — NEOSTIGMINE METHYLSULFATE 3 MG: 1 INJECTION INTRAVENOUS at 19:13:00

## 2021-09-13 RX ADMIN — SODIUM CHLORIDE, SODIUM LACTATE, POTASSIUM CHLORIDE, CALCIUM CHLORIDE: 600; 310; 30; 20 INJECTION, SOLUTION INTRAVENOUS at 19:16:00

## 2021-09-13 RX ADMIN — ROCURONIUM BROMIDE 50 MG: 10 INJECTION, SOLUTION INTRAVENOUS at 13:48:00

## 2021-09-13 RX ADMIN — SODIUM CHLORIDE, SODIUM LACTATE, POTASSIUM CHLORIDE, CALCIUM CHLORIDE: 600; 310; 30; 20 INJECTION, SOLUTION INTRAVENOUS at 13:41:00

## 2021-09-13 RX ADMIN — ROCURONIUM BROMIDE 10 MG: 10 INJECTION, SOLUTION INTRAVENOUS at 17:15:00

## 2021-09-13 RX ADMIN — LIDOCAINE HYDROCHLORIDE 50 MG: 10 INJECTION, SOLUTION EPIDURAL; INFILTRATION; INTRACAUDAL; PERINEURAL at 13:48:00

## 2021-09-13 RX ADMIN — ROCURONIUM BROMIDE 10 MG: 10 INJECTION, SOLUTION INTRAVENOUS at 15:33:00

## 2021-09-13 RX ADMIN — CEFAZOLIN SODIUM/WATER 2 G: 2 G/20 ML SYRINGE (ML) INTRAVENOUS at 13:51:00

## 2021-09-13 RX ADMIN — ONDANSETRON 4 MG: 2 INJECTION INTRAMUSCULAR; INTRAVENOUS at 19:16:00

## 2021-09-13 RX ADMIN — ROCURONIUM BROMIDE 20 MG: 10 INJECTION, SOLUTION INTRAVENOUS at 14:33:00

## 2021-09-13 RX ADMIN — DEXAMETHASONE SODIUM PHOSPHATE 8 MG: 4 MG/ML VIAL (ML) INJECTION at 13:54:00

## 2021-09-13 RX ADMIN — PHENYLEPHRINE HCL 100 MCG: 10 MG/ML VIAL (ML) INJECTION at 16:05:00

## 2021-09-13 RX ADMIN — GLYCOPYRROLATE 0.2 MG: 0.2 INJECTION, SOLUTION INTRAMUSCULAR; INTRAVENOUS at 19:13:00

## 2021-09-13 NOTE — PROGRESS NOTES
Presents for HoLEP since prostate was too large for Urolift.  His prostate volume was 127 mL on TRUS on 7/23/2020. Measured 101cc on recent CT.     Interviewed, examined pt, answered questions. No new history or findings. Cardiac  Clearance noted.  Off apix

## 2021-09-13 NOTE — ANESTHESIA PREPROCEDURE EVALUATION
PRE-OP EVALUATION    Patient Name: Nusrat Walelr    Admit Diagnosis: Hypertrophy of prostate with urinary obstruction [N40.1, N13.8]  Other obstructive and reflux uropathy [N13.8]    Pre-op Diagnosis: Hypertrophy of prostate with urinary obstruction [P6 Cholecalciferol, 1000 units Oral Tab, Take 3,000 Units by mouth daily. , Disp: , Rfl: , Past Week at Unknown time  Multiple Vitamin (ONE-DAILY MULTI VITAMINS) Oral Tab, Take 1 tablet by mouth daily. , Disp: , Rfl: , Past Week at Unknown time  Vitamin C 500 Pulmonary                    (+) sleep apnea and CPAP      Neuro/Psych                              As per Epic:  Patient Active Problem List:     Chronic atrial fibrillation ([facility] [address]     Essential hypertension, benign     Impaired fa Component Value Date     08/02/2021    K 4.70 08/02/2021     08/02/2021    CO2 29.0 08/02/2021    BUN 16.0 08/02/2021    CREATSERUM 1.20 08/02/2021    GLU 89 08/02/2021    CA 10.0 08/02/2021            Airway      Mallampati: III  Mouth openi

## 2021-09-13 NOTE — H&P
HPI     Benign prostatic hyperplasia with urinary hesitancy  He is following with urology.  He has been seen by both Reagan Rodriguez and Quang Byrd since 3/2021 for persistent urinary frequency, hesitancy and nocturia.  Patient had cystoscopy, assessment of PVR on BID.     Ventricular tachycardia (HCC) / H/O syncope / ICD (implantable cardioverter-defibrillator) [place]   Last cardiology appointment 6/19/20. Bruna iJn that office visit no new symptoms, no changes to medication.  He is on sotalol 120 mg BID.     Patient ev    Patient Care Team: Patient Care Team:  Daija Teran MD as PCP - General (Internal Medicine)  Peewee Meraz RN as PCP - [address] [place] [address] (Registered Nurse)  Jerrica Segura MD (Cardiac Electrophysiology)     Patient Active Problem List:     VROBQ and Cr Latest Ref Rng & Units 8/2/2021 3/9/2021 11/30/2020 8/27/2020 2/20/2020 2/18/2020 8/26/2019   BUN 7.0 - 22.0 mg/dL 16.0 18 15.0 20.0 15.0 15 13.0   Creatinine 0.6 - 1.2 mg/dL 1.20 0.94 1.00 1.00 1.15 1.02 0.90   Some recent data might be hidden     10/04/2028     Flex Sigmoidoscopy Screen every 5 years No results found for this or any previous visit.     Fecal Occult Blood Annually No results found for: FOB, OCCULTSTOOL   Glaucoma Screening       Ophthalmology Visit Annually  > 1 year   Immunizations Periodic limb movement 4/12/2011   • Pneumonia, organism unspecified(486)     • Prediabetes     • Unspecified essential hypertension     • Unspecified sleep apnea       cpap             Past Surgical History:   Procedure Laterality Date   • APPENDECTOMY   lesions  EYES: denies blurred vision or double vision  HEENT: denies nasal congestion, sinus pain or ST  LUNGS: denies shortness of breath with exertion  CARDIOVASCULAR: see  GI: denies abdominal pain, denies heartburn  : see HPI  MUSCULOSKELETAL: recent disease [facility])  --last evaluation in [facility] (1/2019) revealing non hemodynamically significant disease  --discussed importance of comprehensive approach to management of vascular disease including diet, exercise, weight loss  --continue statin, asprin  - perfomed  Exercise counseling perfomed     SUGGESTED VACCINATIONS - Influenza, Pneumococcal, Zoster, Tetanus   Influenza: No recommendations at this time  Pneumonia: No recommendations at this time               Electronically signed by Cony Canales MD a

## 2021-09-13 NOTE — PROGRESS NOTES
Presents for HoLEP since prostate was too large for Urolift.  His prostate volume was 127 mL on TRUS on 7/23/2020. Measured 101cc on recent CT. Interviewed, examined pt, answered questions. No new history or findings. Cardiac  Clearance noted.  Off apixa

## 2021-09-13 NOTE — ANESTHESIA PROCEDURE NOTES
Airway  Date/Time: 9/13/2021 1:50 PM  Urgency: elective    Airway not difficult    General Information and Staff    Patient location during procedure: OR  Anesthesiologist: Dale Granados MD  Resident/CRNA: Ryan Jasso CRNA  Performed: CRNA     Indications

## 2021-09-14 LAB
ANION GAP SERPL CALC-SCNC: 5 MMOL/L (ref 0–18)
ATRIAL RATE: 90 BPM
BASOPHILS # BLD AUTO: 0.02 X10(3) UL (ref 0–0.2)
BASOPHILS NFR BLD AUTO: 0.1 %
BUN BLD-MCNC: 17 MG/DL (ref 7–18)
CALCIUM BLD-MCNC: 8 MG/DL (ref 8.5–10.1)
CHLORIDE SERPL-SCNC: 112 MMOL/L (ref 98–112)
CO2 SERPL-SCNC: 21 MMOL/L (ref 21–32)
CREAT BLD-MCNC: 1.25 MG/DL
EOSINOPHIL # BLD AUTO: 0 X10(3) UL (ref 0–0.7)
EOSINOPHIL NFR BLD AUTO: 0 %
ERYTHROCYTE [DISTWIDTH] IN BLOOD BY AUTOMATED COUNT: 15.7 %
GLUCOSE BLD-MCNC: 114 MG/DL (ref 70–99)
HCT VFR BLD AUTO: 41.7 %
HGB BLD-MCNC: 13.7 G/DL
IMM GRANULOCYTES # BLD AUTO: 0.07 X10(3) UL (ref 0–1)
IMM GRANULOCYTES NFR BLD: 0.5 %
LYMPHOCYTES # BLD AUTO: 1.75 X10(3) UL (ref 1–4)
LYMPHOCYTES NFR BLD AUTO: 11.9 %
MCH RBC QN AUTO: 28 PG (ref 26–34)
MCHC RBC AUTO-ENTMCNC: 32.9 G/DL (ref 31–37)
MCV RBC AUTO: 85.3 FL
MONOCYTES # BLD AUTO: 1.37 X10(3) UL (ref 0.1–1)
MONOCYTES NFR BLD AUTO: 9.3 %
NEUTROPHILS # BLD AUTO: 11.51 X10 (3) UL (ref 1.5–7.7)
NEUTROPHILS # BLD AUTO: 11.51 X10(3) UL (ref 1.5–7.7)
NEUTROPHILS NFR BLD AUTO: 78.2 %
OSMOLALITY SERPL CALC.SUM OF ELEC: 288 MOSM/KG (ref 275–295)
PLATELET # BLD AUTO: 200 10(3)UL (ref 150–450)
POTASSIUM SERPL-SCNC: 4.6 MMOL/L (ref 3.5–5.1)
Q-T INTERVAL: 354 MS
QRS DURATION: 110 MS
QTC CALCULATION (BEZET): 435 MS
R AXIS: 89 DEGREES
RBC # BLD AUTO: 4.89 X10(6)UL
SODIUM SERPL-SCNC: 138 MMOL/L (ref 136–145)
T AXIS: 53 DEGREES
VENTRICULAR RATE: 91 BPM
WBC # BLD AUTO: 14.7 X10(3) UL (ref 4–11)

## 2021-09-14 PROCEDURE — 93005 ELECTROCARDIOGRAM TRACING: CPT

## 2021-09-14 PROCEDURE — 80048 BASIC METABOLIC PNL TOTAL CA: CPT | Performed by: UROLOGY

## 2021-09-14 PROCEDURE — 85025 COMPLETE CBC W/AUTO DIFF WBC: CPT | Performed by: HOSPITALIST

## 2021-09-14 PROCEDURE — 93010 ELECTROCARDIOGRAM REPORT: CPT | Performed by: INTERNAL MEDICINE

## 2021-09-14 RX ORDER — LISINOPRIL 20 MG/1
20 TABLET ORAL 2 TIMES DAILY
Status: DISCONTINUED | OUTPATIENT
Start: 2021-09-14 | End: 2021-09-15

## 2021-09-14 RX ORDER — LISINOPRIL 20 MG/1
20 TABLET ORAL 2 TIMES DAILY
Status: DISCONTINUED | OUTPATIENT
Start: 2021-09-14 | End: 2021-09-14

## 2021-09-14 NOTE — CONSULTS
[facility] Cardiology Consultation    Philippe Garza Patient Status:  Outpatient in a Bed    1945 MRN [id number]   [facility] 3NW-A Attending Carrie Mcarthur MD   Hosp Day # 0 PCP Adonis Azevedo MD     Reason for Consultation:  NSVT      Hist knee    Medications:  • latanoprost  1 drop Both Eyes Nightly   • Vitamin D3 (Cholecalciferol)  3,000 Units Oral Daily   • multivitamin  1 tablet Oral Daily   • Vitamin C  1,000 mg Oral Daily   • lisinopril  5 mg Oral Daily   • Sotalol HCl  120 mg Oral BID PTP, INR in the last 168 hours. No results for input(s): TROP, CK in the last 168 hours. Impression:   1. Chronic afib - rate control and xarelto as o/p.  2. H/o NSVT - on sotalol. Has AICD. 3. S/p TURP 9/13/21  4. Morbid obesity.   5. CAD with os

## 2021-09-14 NOTE — OPERATIVE REPORT
[facility] Patient Status:  Hospital Outpatient Surgery    1945 MRN [id number]   [facility] SURGERY Attending Jason Metz MD   [facility] Day # 0 PCP Chely Kim MD     Date of Operation;  2021    Procedure coagulation mode were used. Incisions were made using the holmium laser from the bladder neck down to the verumontanum at the 5:00 and 7:00 positions.   At the level of the verumontanum the median lobe was then teased off of the capsule and pushed anterior

## 2021-09-14 NOTE — PROGRESS NOTES
Monitor tech called writing RN , states patient had a 5 beats of VT's . Vitals stable , asymptomatic . Denies any sob or chest pain . On tele with uncontrolled a fib . Denies any pain .  Notified MD

## 2021-09-14 NOTE — CONSULTS
.  Reason for consult: periop mgmt    Consulted by: Dr. Lesvia Sams    PCP: Kiera Valles MD      History of Present Illness: Patient is a 67year old male with PMH sig for afib on xarelto, HTN, RENETTA on cpap, obesity, HL, BPH who underwent transurethral vapoenucl EVENING, Disp: 180 tablet, Rfl: 3  Sotalol HCl 120 MG Oral Tab, Take 1 tablet (120 mg total) by mouth 2 (two) times daily. , Disp: 180 tablet, Rfl: 2  Quinapril HCl 40 MG Oral Tab, Take 1 tablet (40 mg total) by mouth 2 (two) times daily. , Disp: 180 tablet, OBJECTIVE:  /84 (BP Location: Right arm)   Pulse 86   Temp 98.2 °F (36.8 °C) (Oral)   Resp 21   Wt 247 lb 5.7 oz (112.2 kg)   SpO2 95%   BMI 35.49 kg/m²   General:  Alert, no distress, appears stated age.    Head:  Normocephalic, without obvious records reviewed confirming patient's medical history and medications. Further recommendations pending patient's clinical course.   [facility] hospitalist to continue to follow patient while in house    Patient and/or patient's family given opportunity to ask

## 2021-09-14 NOTE — PLAN OF CARE
Problem: Patient/Family Goals  Goal: Patient/Family Long Term Goal  Description: Patient's Long Term Goal: discharge home    Interventions:  - Continue CBI, monitor urine   -ambulate, tolerate diet  - See additional Care Plan goals for specific interventio

## 2021-09-14 NOTE — PROGRESS NOTES
[facility]  Urology Progress Note    Philippe Garza Patient Status:  Outpatient in a Bed    1945 MRN [id number]   [facility] 3NW-A Attending Carrie Mcarthur MD   Hosp Day # 0 PCP Adonis Azevedo MD     Subjective:   Philippe Garza 10/25/2021  3:00 PM Cachorro Thornton MD CG [facility] [place]   10/28/2021  1:40 PM ROMULO Munson SB [facility]   10/29/2021  3:00 PM Uvaldo Amador MD [address]   6/17/2022  1:00  [address]   6/17/2022  1:20

## 2021-09-14 NOTE — ANESTHESIA POSTPROCEDURE EVALUATION
[address] [facility] Patient Status:  Hospital Outpatient Surgery   Age/Gender 67year old male MRN [id number]   [facility] SURGERY Attending Carrie Mcarthur MD   [facility] Day # 0 PCP Adonis Azevedo MD       Anesthesia Post-op Note

## 2021-09-14 NOTE — PLAN OF CARE
Problem: Patient/Family Goals  Goal: Patient/Family Long Term Goal  Description: Patient's Long Term Goal: go home     Interventions:  - whitney   Soft diet   - See additional Care Plan goals for specific interventions  Outcome: Progressing  Goal: Patient/ appetite . Denies any nausea or vomiting . Up in chair , consulted cardiology . Plan of care discussed , answered all questions . Verbalized understanding .  Will continue to monitor

## 2021-09-14 NOTE — PLAN OF CARE
NURSING ADMISSION NOTE      Patient admitted via bed from PACU  Oriented to room. Safety precautions initiated. Bed in low position. Call light in reach. Pt a&ox4, VSS, afebrile. Afib on telemetry.  RENETTA w/ CPAP, pt has his own mask and tubing in r

## 2021-09-15 VITALS
BODY MASS INDEX: 35 KG/M2 | DIASTOLIC BLOOD PRESSURE: 78 MMHG | WEIGHT: 247.38 LBS | OXYGEN SATURATION: 99 % | RESPIRATION RATE: 16 BRPM | HEART RATE: 70 BPM | SYSTOLIC BLOOD PRESSURE: 143 MMHG | TEMPERATURE: 98 F

## 2021-09-15 LAB
ANION GAP SERPL CALC-SCNC: 3 MMOL/L (ref 0–18)
BUN BLD-MCNC: 16 MG/DL (ref 7–18)
CALCIUM BLD-MCNC: 8.6 MG/DL (ref 8.5–10.1)
CHLORIDE SERPL-SCNC: 111 MMOL/L (ref 98–112)
CO2 SERPL-SCNC: 26 MMOL/L (ref 21–32)
CREAT BLD-MCNC: 0.9 MG/DL
GLUCOSE BLD-MCNC: 92 MG/DL (ref 70–99)
MAGNESIUM SERPL-MCNC: 2.1 MG/DL (ref 1.6–2.6)
OSMOLALITY SERPL CALC.SUM OF ELEC: 291 MOSM/KG (ref 275–295)
POTASSIUM SERPL-SCNC: 4.6 MMOL/L (ref 3.5–5.1)
SODIUM SERPL-SCNC: 140 MMOL/L (ref 136–145)

## 2021-09-15 PROCEDURE — 83735 ASSAY OF MAGNESIUM: CPT | Performed by: HOSPITALIST

## 2021-09-15 PROCEDURE — 80048 BASIC METABOLIC PNL TOTAL CA: CPT | Performed by: HOSPITALIST

## 2021-09-15 RX ORDER — SULFAMETHOXAZOLE AND TRIMETHOPRIM 800; 160 MG/1; MG/1
1 TABLET ORAL 2 TIMES DAILY
Qty: 6 TABLET | Refills: 0 | Status: SHIPPED | OUTPATIENT
Start: 2021-09-15 | End: 2021-09-18

## 2021-09-15 NOTE — PLAN OF CARE
A&Ox4. VSS. RA.   Telemetry: A-fib--controlled  GI: Abdomen soft, nondistended. Passing gas. Denies nausea at this time. : Saxena draining a light pink/orange color. CBI has been clamped.   Pain well controlled--pt describes pain as :more of a discomf pharmacy to review patient's medication profile  - Implement strategies to promote bladder emptying  Outcome: Progressing

## 2021-09-15 NOTE — PLAN OF CARE
Problem: Patient/Family Goals  Goal: Patient/Family Long Term Goal  Description: Patient's Long Term Goal: Discharge    Interventions:  -Tolerate pain and diet  - See additional Care Plan goals for specific interventions  Outcome: Adequate for Discharge teaching done . Answered all questions , supplies given . Denies any pain medicine . Rating pain 2/10 . Plan of care discussed , answered all questions . Verbalized understanding .  Will continue to monitor

## 2021-09-15 NOTE — PROGRESS NOTES
[facility]  Urology Progress Note    Philippe Garza Patient Status:  Outpatient in a Bed    1945 MRN [id number]   [facility] 3NW-A Attending Carrie Mcarthur MD   Hosp Day # 0 PCP Adonis Azevedo MD     Subjective:   Philippe Garza 9/16/2021  2:30 AM SP DEVICE REMOTE TX [facility]  [place] [address]   9/21/2021 11:45 AM Silke JASMINE MD NPV RCK URO [facility] NPV RCK   10/25/2021  3:00 PM Gucci Caputo MD CG DERM [facility]   10/28/2021  1:40 PM [facility] [place]   10/29/2021  3

## 2021-09-15 NOTE — PROGRESS NOTES
NURSING DISCHARGE NOTE    Discharged Home via Wheelchair. Accompanied by Family member and Support staff  Belongings Taken by patient/family. Discussed discharge instructions with patient and family . Answered all questions .  Verbalized understanding

## 2021-09-15 NOTE — PROGRESS NOTES
[facility] Cardiology Progress Note        Ambrocio May Patient Status:  Outpatient in a Bed    1945 MRN [id number]   [facility] 3NW-A Attending Ceci Curtis MD   [facility]osp Day [facility]    111   CO2 21.0 26.0   BUN 17 16   CREATSERUM 1.25 0.90   CA 8.0* 8.6   MG  --  2.1   * 92       No results for input(s): ALT, AST, ALB, AMYLASE, LIPASE, LDH in the last 168 hours.     Invalid input(s): ALPHOS, TBIL, DBIL, TPROT    No res

## 2021-09-15 NOTE — DISCHARGE SUMMARY
General Medicine Discharge Summary     Patient ID:  Kusum White  67year old  9/17/1945    Admit date: 9/13/2021    Discharge date and time: 9/15/2021    Attending Physician: Ruthy Mares MD     Primary Care Physician: Jacque Garcia MD     Reason fo Oral Tab  Take 1 tablet (20 mg total) by mouth nightly. Niacin ER, Antihyperlipidemic, 1000 MG Oral Tab CR  Take 1 tablet (1,000 mg total) by mouth nightly.     METFORMIN  MG Oral Tab  TAKE 2 TABLETS(1000 MG) BY MOUTH EVERY EVENING    Sotalol HCl

## 2021-09-21 NOTE — H&P
Benign prostatic hyperplasia with urinary hesitancy  He is following with urology.  He has been seen by both Reagan Crawford and Joselin Rapp since 3/2021 for persistent urinary frequency, hesitancy and nocturia.  Patient had cystoscopy, assessment of PVR on 7/23/2 BID.     Ventricular tachycardia (HCC) / H/O syncope / ICD (implantable cardioverter-defibrillator) St.Иван   Last cardiology appointment 6/19/20. Yarelis Dukes that office visit no new symptoms, no changes to medication.  He is on sotalol 120 mg BID.     Patient ev symptoms.            Patient Care Team: Patient Care Team:  Lynn Jones MD as PCP - General (Internal Medicine)  Ruiz Moore RN as PCP - [address] [place] [address] (Registered Nurse)  Uvaldo Amador MD (Cardiac Electrophysiology)     Patient Active Problem List be hidden      BUN and Cr Latest Ref Rng & Units 8/2/2021 3/9/2021 11/30/2020 8/27/2020 2/20/2020 2/18/2020 8/26/2019   BUN 7.0 - 22.0 mg/dL 16.0 18 15.0 20.0 15.0 15 13.0   Creatinine 0.6 - 1.2 mg/dL 1.20 0.94 1.00 1.00 1.15 1.02 0.90   Some recent data m Screen every 10 years Colonoscopy due on 10/04/2028     Flex Sigmoidoscopy Screen every 5 years No results found for this or any previous visit.     Fecal Occult Blood Annually No results found for: FOB, OCCULTSTOOL   Glaucoma Screening       Ophthalmology Osteoarthritis     • Other and unspecified hyperlipidemia     • Periodic limb movement 4/12/2011   • Pneumonia, organism unspecified(486)     • Prediabetes     • Unspecified essential hypertension     • Unspecified sleep apnea       cpap                 Pa SYSTEMS:   GENERAL: feels well otherwise  SKIN: denies any unusual skin lesions  EYES: denies blurred vision or double vision  HEENT: denies nasal congestion, sinus pain or ST  LUNGS: denies shortness of breath with exertion  CARDIOVASCULAR: see  GI: jus coronary artery of native heart without angina pectoris / Small vessel disease ([address]  --last evaluation in [facility] (1/2019) revealing non hemodynamically significant disease  --discussed importance of comprehensive approach to management of vascular dise return in 3 months for office visit  Diet counseling perfomed  Exercise counseling perfomed     SUGGESTED VACCINATIONS - Influenza, Pneumococcal, Zoster, Tetanus   Influenza: No recommendations at this time  Pneumonia: No recommendations at this time

## 2021-09-27 ENCOUNTER — LAB ENCOUNTER (OUTPATIENT)
Dept: LAB | Age: 76
End: 2021-09-27
Attending: UROLOGY
Payer: MEDICARE

## 2021-09-27 DIAGNOSIS — R30.0 DYSURIA: ICD-10-CM

## 2021-09-27 PROCEDURE — 87086 URINE CULTURE/COLONY COUNT: CPT

## 2021-09-27 PROCEDURE — 81001 URINALYSIS AUTO W/SCOPE: CPT

## 2021-11-03 ENCOUNTER — LAB ENCOUNTER (OUTPATIENT)
Dept: LAB | Age: 76
End: 2021-11-03
Attending: UROLOGY
Payer: MEDICARE

## 2021-11-03 DIAGNOSIS — R30.0 DYSURIA: ICD-10-CM

## 2021-11-03 PROCEDURE — 87086 URINE CULTURE/COLONY COUNT: CPT

## 2021-11-03 PROCEDURE — 81001 URINALYSIS AUTO W/SCOPE: CPT

## 2021-12-07 ENCOUNTER — HOSPITAL ENCOUNTER (EMERGENCY)
Facility: HOSPITAL | Age: 76
Discharge: HOME | End: 2021-12-07
Attending: EMERGENCY MEDICINE
Payer: MEDICARE

## 2021-12-07 ENCOUNTER — APPOINTMENT (OUTPATIENT)
Dept: GENERAL RADIOLOGY | Facility: HOSPITAL | Age: 76
End: 2021-12-07
Attending: EMERGENCY MEDICINE
Payer: MEDICARE

## 2021-12-07 VITALS
HEIGHT: 70 IN | SYSTOLIC BLOOD PRESSURE: 141 MMHG | RESPIRATION RATE: 17 BRPM | OXYGEN SATURATION: 98 % | TEMPERATURE: 99 F | WEIGHT: 244 LBS | HEART RATE: 66 BPM | BODY MASS INDEX: 34.93 KG/M2 | DIASTOLIC BLOOD PRESSURE: 82 MMHG

## 2021-12-07 DIAGNOSIS — Z95.810 AICD PROBLEM: Primary | ICD-10-CM

## 2021-12-07 DIAGNOSIS — T82.9XXA AICD PROBLEM: Primary | ICD-10-CM

## 2021-12-07 PROCEDURE — 99284 EMERGENCY DEPT VISIT MOD MDM: CPT

## 2021-12-07 PROCEDURE — 71045 X-RAY EXAM CHEST 1 VIEW: CPT | Performed by: EMERGENCY MEDICINE

## 2021-12-07 PROCEDURE — 84484 ASSAY OF TROPONIN QUANT: CPT | Performed by: EMERGENCY MEDICINE

## 2021-12-07 PROCEDURE — 36415 COLL VENOUS BLD VENIPUNCTURE: CPT

## 2021-12-07 PROCEDURE — 93010 ELECTROCARDIOGRAM REPORT: CPT

## 2021-12-07 PROCEDURE — 80053 COMPREHEN METABOLIC PANEL: CPT | Performed by: EMERGENCY MEDICINE

## 2021-12-07 PROCEDURE — 93005 ELECTROCARDIOGRAM TRACING: CPT

## 2021-12-07 PROCEDURE — 85025 COMPLETE CBC W/AUTO DIFF WBC: CPT | Performed by: EMERGENCY MEDICINE

## 2021-12-07 NOTE — ED PROVIDER NOTES
Patient Seen in: [facility] Emergency Department      History   Patient presents with:  Arrythmia/Palpitations  Dizziness    Stated Complaint: feels like defibrillator went off    Subjective:   HPI    Patient is a 74-year-old male comes to emergency Cystoscopy w/ Dr Briones Age History    Tobacco Use      Smoking status: Former Smoker        Types: Cigars        Quit date: 2013        Years since quittin.0      Smokeless tobacco: Never Used      Tobacco comment: cigar 1 wk; Ayden Wyatt COMP METABOLIC PANEL (14) - Normal   RAPID SARS-COV-2 BY PCR - Normal   CBC WITH DIFFERENTIAL WITH PLATELET    Narrative: The following orders were created for panel order CBC With Differential With Platelet.   Procedure concerning, new, changing or persisting symptoms. I discussed the case with the patient and they had no questions, complaints, or concerns. Patient felt comfortable going home.                            Disposition and Plan     Clinical Impression:  VIOLETTE

## 2021-12-07 NOTE — ED INITIAL ASSESSMENT (HPI)
Defib machine placed end pf January 2019. Pt feels \"elecric shocks\" on L side of chest about every hour.

## 2021-12-09 PROBLEM — R57.9 SHOCK (HCC): Status: ACTIVE | Noted: 2021-12-09

## 2022-01-31 PROBLEM — C61 PROSTATE CANCER (HCC): Status: ACTIVE | Noted: 2022-01-31

## 2022-02-09 PROBLEM — R57.9 SHOCK (HCC): Status: RESOLVED | Noted: 2021-12-09 | Resolved: 2022-02-09

## 2022-06-24 ENCOUNTER — APPOINTMENT (OUTPATIENT)
Dept: CV DIAGNOSTICS | Facility: HOSPITAL | Age: 77
End: 2022-06-24
Attending: EMERGENCY MEDICINE
Payer: MEDICARE

## 2022-06-24 ENCOUNTER — APPOINTMENT (OUTPATIENT)
Dept: GENERAL RADIOLOGY | Facility: HOSPITAL | Age: 77
End: 2022-06-24
Payer: MEDICARE

## 2022-06-24 ENCOUNTER — HOSPITAL ENCOUNTER (EMERGENCY)
Facility: HOSPITAL | Age: 77
Discharge: HOME | End: 2022-06-24
Attending: EMERGENCY MEDICINE
Payer: MEDICARE

## 2022-06-24 VITALS
RESPIRATION RATE: 18 BRPM | OXYGEN SATURATION: 98 % | HEIGHT: 70 IN | WEIGHT: 253 LBS | TEMPERATURE: 97 F | DIASTOLIC BLOOD PRESSURE: 89 MMHG | BODY MASS INDEX: 36.22 KG/M2 | HEART RATE: 72 BPM | SYSTOLIC BLOOD PRESSURE: 149 MMHG

## 2022-06-24 DIAGNOSIS — R07.9 ACUTE CHEST PAIN: Primary | ICD-10-CM

## 2022-06-24 LAB
ALBUMIN SERPL-MCNC: 4 G/DL (ref 3.4–5)
ALBUMIN/GLOB SERPL: 1.2 {RATIO} (ref 1–2)
ALP LIVER SERPL-CCNC: 53 U/L
ALT SERPL-CCNC: 28 U/L
ANION GAP SERPL CALC-SCNC: 7 MMOL/L (ref 0–18)
AST SERPL-CCNC: 18 U/L (ref 15–37)
ATRIAL RATE: 87 BPM
BASOPHILS # BLD AUTO: 0.08 X10(3) UL (ref 0–0.2)
BASOPHILS NFR BLD AUTO: 1.1 %
BILIRUB SERPL-MCNC: 0.5 MG/DL (ref 0.1–2)
BUN BLD-MCNC: 24 MG/DL (ref 7–18)
CALCIUM BLD-MCNC: 9.2 MG/DL (ref 8.5–10.1)
CHLORIDE SERPL-SCNC: 107 MMOL/L (ref 98–112)
CO2 SERPL-SCNC: 22 MMOL/L (ref 21–32)
CREAT BLD-MCNC: 1.08 MG/DL
EOSINOPHIL # BLD AUTO: 0.22 X10(3) UL (ref 0–0.7)
EOSINOPHIL NFR BLD AUTO: 3 %
ERYTHROCYTE [DISTWIDTH] IN BLOOD BY AUTOMATED COUNT: 15.5 %
GLOBULIN PLAS-MCNC: 3.4 G/DL (ref 2.8–4.4)
GLUCOSE BLD-MCNC: 107 MG/DL (ref 70–99)
HCT VFR BLD AUTO: 48.3 %
HGB BLD-MCNC: 15.7 G/DL
IMM GRANULOCYTES # BLD AUTO: 0.03 X10(3) UL (ref 0–1)
IMM GRANULOCYTES NFR BLD: 0.4 %
LYMPHOCYTES # BLD AUTO: 2.34 X10(3) UL (ref 1–4)
LYMPHOCYTES NFR BLD AUTO: 32.2 %
MCH RBC QN AUTO: 28.3 PG (ref 26–34)
MCHC RBC AUTO-ENTMCNC: 32.5 G/DL (ref 31–37)
MCV RBC AUTO: 87.2 FL
MONOCYTES # BLD AUTO: 0.7 X10(3) UL (ref 0.1–1)
MONOCYTES NFR BLD AUTO: 9.6 %
NEUTROPHILS # BLD AUTO: 3.9 X10 (3) UL (ref 1.5–7.7)
NEUTROPHILS # BLD AUTO: 3.9 X10(3) UL (ref 1.5–7.7)
NEUTROPHILS NFR BLD AUTO: 53.7 %
OSMOLALITY SERPL CALC.SUM OF ELEC: 287 MOSM/KG (ref 275–295)
PLATELET # BLD AUTO: 190 10(3)UL (ref 150–450)
POTASSIUM SERPL-SCNC: 4.1 MMOL/L (ref 3.5–5.1)
PROT SERPL-MCNC: 7.4 G/DL (ref 6.4–8.2)
Q-T INTERVAL: 422 MS
QRS DURATION: 116 MS
QTC CALCULATION (BEZET): 513 MS
R AXIS: -49 DEGREES
RBC # BLD AUTO: 5.54 X10(6)UL
SARS-COV-2 RNA RESP QL NAA+PROBE: NOT DETECTED
SODIUM SERPL-SCNC: 136 MMOL/L (ref 136–145)
T AXIS: 25 DEGREES
TROPONIN I HIGH SENSITIVITY: 6 NG/L
VENTRICULAR RATE: 89 BPM
WBC # BLD AUTO: 7.3 X10(3) UL (ref 4–11)

## 2022-06-24 PROCEDURE — 84484 ASSAY OF TROPONIN QUANT: CPT | Performed by: EMERGENCY MEDICINE

## 2022-06-24 PROCEDURE — 71045 X-RAY EXAM CHEST 1 VIEW: CPT | Performed by: EMERGENCY MEDICINE

## 2022-06-24 PROCEDURE — 96374 THER/PROPH/DIAG INJ IV PUSH: CPT

## 2022-06-24 PROCEDURE — 80053 COMPREHEN METABOLIC PANEL: CPT | Performed by: EMERGENCY MEDICINE

## 2022-06-24 PROCEDURE — 99285 EMERGENCY DEPT VISIT HI MDM: CPT

## 2022-06-24 PROCEDURE — 93017 CV STRESS TEST TRACING ONLY: CPT | Performed by: EMERGENCY MEDICINE

## 2022-06-24 PROCEDURE — 85025 COMPLETE CBC W/AUTO DIFF WBC: CPT | Performed by: EMERGENCY MEDICINE

## 2022-06-24 PROCEDURE — 93005 ELECTROCARDIOGRAM TRACING: CPT

## 2022-06-24 PROCEDURE — 93010 ELECTROCARDIOGRAM REPORT: CPT

## 2022-06-24 PROCEDURE — 78452 HT MUSCLE IMAGE SPECT MULT: CPT | Performed by: EMERGENCY MEDICINE

## 2022-06-24 PROCEDURE — S0028 INJECTION, FAMOTIDINE, 20 MG: HCPCS | Performed by: EMERGENCY MEDICINE

## 2022-06-24 RX ORDER — ACETAMINOPHEN 500 MG
1000 TABLET ORAL ONCE
Status: DISCONTINUED | OUTPATIENT
Start: 2022-06-24 | End: 2022-06-24

## 2022-06-24 RX ORDER — FAMOTIDINE 10 MG/ML
20 INJECTION, SOLUTION INTRAVENOUS ONCE
Status: COMPLETED | OUTPATIENT
Start: 2022-06-24 | End: 2022-06-24

## 2022-06-24 RX ORDER — ASPIRIN 81 MG/1
324 TABLET, CHEWABLE ORAL ONCE
Status: COMPLETED | OUTPATIENT
Start: 2022-06-24 | End: 2022-06-24

## 2022-06-24 NOTE — ED INITIAL ASSESSMENT (HPI)
Chest pain since 0830 this morning. \"feels like indigestion\". Central intermittent pain 3/10 when present. Denies pain, sob, n/v now.

## 2022-06-24 NOTE — PROGRESS NOTES
CARDIODIAGNOSTIC PRELIMINARY REPORT:      MANUAL protocol completed for 4:31 minutes, tolerated well      Second set of images pending

## 2022-06-24 NOTE — PLAN OF CARE
Preliminary nuclear stress test results as called to me by Radiology (Dr. Odalis Villatoro). LVEF 52% and negative for perfusion abnormalities.      ROMULO Herrera

## 2023-03-09 ENCOUNTER — HOSPITAL ENCOUNTER (OUTPATIENT)
Dept: GENERAL RADIOLOGY | Age: 78
Discharge: HOME | End: 2023-03-09
Attending: INTERNAL MEDICINE
Payer: MEDICARE

## 2023-03-09 DIAGNOSIS — M54.50 LOW BACK PAIN: ICD-10-CM

## 2023-03-09 PROCEDURE — 72100 X-RAY EXAM L-S SPINE 2/3 VWS: CPT | Performed by: INTERNAL MEDICINE

## 2023-04-19 ENCOUNTER — HOSPITAL ENCOUNTER (OUTPATIENT)
Dept: MRI IMAGING | Facility: HOSPITAL | Age: 78
Discharge: HOME | End: 2023-04-19
Attending: INTERNAL MEDICINE
Payer: MEDICARE

## 2023-04-19 VITALS
DIASTOLIC BLOOD PRESSURE: 86 MMHG | SYSTOLIC BLOOD PRESSURE: 134 MMHG | HEART RATE: 90 BPM | OXYGEN SATURATION: 92 % | RESPIRATION RATE: 18 BRPM

## 2023-04-19 DIAGNOSIS — M54.50 ACUTE LOW BACK PAIN: ICD-10-CM

## 2023-04-19 PROCEDURE — 72148 MRI LUMBAR SPINE W/O DYE: CPT | Performed by: INTERNAL MEDICINE

## 2023-04-19 NOTE — IMAGING NOTE
1225- Pt taken to MRI holding by MRI Tech. AICD placed in MRI safe mode by Ki Liriano and SBAR received. Settings are VOO at 90 bpm per rep/order. Pt connected to cardiac monitor, pulse ox and b/p cuff. Pt assisted on to MRI table. 1309- Pt taken back to MRI holding and AICD/PPM settings resumed by rep. Pt tolerated MRI scan well and denies any complaints.

## 2024-01-25 NOTE — DISCHARGE SUMMARY
General Medicine Discharge Summary     Patient ID:  Laura Gordon  66year old  9/17/1945    Admit date: 4/19/2017    Discharge date and time:  4/24/17    Attending Physician: Bruce Hines, not think this is source of fevers and no intervention needed  -supportive care. Lactic acid unremarkable  -consulted infectious disease, appreciate.  Possible transient bacteremia from appendigitis though BC neg  -RVP neg, TSH and fT4 unremarkable  - New t The patient is a 55y Female complaining of sent by MD.

## 2024-03-26 ENCOUNTER — APPOINTMENT (OUTPATIENT)
Dept: GENERAL RADIOLOGY | Age: 79
End: 2024-03-26
Attending: EMERGENCY MEDICINE
Payer: MEDICARE

## 2024-03-26 ENCOUNTER — HOSPITAL ENCOUNTER (EMERGENCY)
Age: 79
Discharge: HOME | End: 2024-03-26
Attending: EMERGENCY MEDICINE
Payer: MEDICARE

## 2024-03-26 VITALS
WEIGHT: 250 LBS | HEART RATE: 70 BPM | TEMPERATURE: 99 F | SYSTOLIC BLOOD PRESSURE: 155 MMHG | HEIGHT: 70 IN | OXYGEN SATURATION: 98 % | RESPIRATION RATE: 17 BRPM | BODY MASS INDEX: 35.79 KG/M2 | DIASTOLIC BLOOD PRESSURE: 88 MMHG

## 2024-03-26 DIAGNOSIS — I10 HYPERTENSION, UNSPECIFIED TYPE: ICD-10-CM

## 2024-03-26 DIAGNOSIS — R07.9 CHEST PAIN OF UNCERTAIN ETIOLOGY: Primary | ICD-10-CM

## 2024-03-26 DIAGNOSIS — K21.00 GASTROESOPHAGEAL REFLUX DISEASE WITH ESOPHAGITIS WITHOUT HEMORRHAGE: ICD-10-CM

## 2024-03-26 LAB
ALBUMIN SERPL-MCNC: 4.2 G/DL (ref 3.4–5)
ALBUMIN/GLOB SERPL: 1.1 {RATIO} (ref 1–2)
ALP LIVER SERPL-CCNC: 59 U/L
ALT SERPL-CCNC: 39 U/L
ANION GAP SERPL CALC-SCNC: 7 MMOL/L (ref 0–18)
AST SERPL-CCNC: 25 U/L (ref 15–37)
BASOPHILS # BLD AUTO: 0.08 X10(3) UL (ref 0–0.2)
BASOPHILS NFR BLD AUTO: 1 %
BILIRUB SERPL-MCNC: 0.5 MG/DL (ref 0.1–2)
BUN BLD-MCNC: 18 MG/DL (ref 9–23)
CALCIUM BLD-MCNC: 9.4 MG/DL (ref 8.5–10.1)
CHLORIDE SERPL-SCNC: 104 MMOL/L (ref 98–112)
CO2 SERPL-SCNC: 27 MMOL/L (ref 21–32)
CREAT BLD-MCNC: 1.29 MG/DL
EGFRCR SERPLBLD CKD-EPI 2021: 56 ML/MIN/1.73M2 (ref 60–?)
EOSINOPHIL # BLD AUTO: 0.31 X10(3) UL (ref 0–0.7)
EOSINOPHIL NFR BLD AUTO: 4.1 %
ERYTHROCYTE [DISTWIDTH] IN BLOOD BY AUTOMATED COUNT: 15.9 %
GLOBULIN PLAS-MCNC: 3.8 G/DL (ref 2.8–4.4)
GLUCOSE BLD-MCNC: 99 MG/DL (ref 70–99)
HCT VFR BLD AUTO: 48.5 %
HGB BLD-MCNC: 16.5 G/DL
IMM GRANULOCYTES # BLD AUTO: 0.03 X10(3) UL (ref 0–1)
IMM GRANULOCYTES NFR BLD: 0.4 %
LYMPHOCYTES # BLD AUTO: 2.48 X10(3) UL (ref 1–4)
LYMPHOCYTES NFR BLD AUTO: 32.5 %
MCH RBC QN AUTO: 29.4 PG (ref 26–34)
MCHC RBC AUTO-ENTMCNC: 34 G/DL (ref 31–37)
MCV RBC AUTO: 86.3 FL
MONOCYTES # BLD AUTO: 0.64 X10(3) UL (ref 0.1–1)
MONOCYTES NFR BLD AUTO: 8.4 %
NEUTROPHILS # BLD AUTO: 4.09 X10 (3) UL (ref 1.5–7.7)
NEUTROPHILS # BLD AUTO: 4.09 X10(3) UL (ref 1.5–7.7)
NEUTROPHILS NFR BLD AUTO: 53.6 %
OSMOLALITY SERPL CALC.SUM OF ELEC: 288 MOSM/KG (ref 275–295)
PLATELET # BLD AUTO: 201 10(3)UL (ref 150–450)
POTASSIUM SERPL-SCNC: 4.2 MMOL/L (ref 3.5–5.1)
PROT SERPL-MCNC: 8 G/DL (ref 6.4–8.2)
Q-T INTERVAL: 434 MS
QRS DURATION: 112 MS
QTC CALCULATION (BEZET): 468 MS
R AXIS: -46 DEGREES
RBC # BLD AUTO: 5.62 X10(6)UL
SODIUM SERPL-SCNC: 138 MMOL/L (ref 136–145)
T AXIS: 9 DEGREES
TROPONIN I SERPL HS-MCNC: 8 NG/L
TROPONIN I SERPL HS-MCNC: 8 NG/L
VENTRICULAR RATE: 70 BPM
WBC # BLD AUTO: 7.6 X10(3) UL (ref 4–11)

## 2024-03-26 PROCEDURE — 80053 COMPREHEN METABOLIC PANEL: CPT

## 2024-03-26 PROCEDURE — 71045 X-RAY EXAM CHEST 1 VIEW: CPT | Performed by: EMERGENCY MEDICINE

## 2024-03-26 PROCEDURE — 99285 EMERGENCY DEPT VISIT HI MDM: CPT

## 2024-03-26 PROCEDURE — 80053 COMPREHEN METABOLIC PANEL: CPT | Performed by: EMERGENCY MEDICINE

## 2024-03-26 PROCEDURE — 84484 ASSAY OF TROPONIN QUANT: CPT | Performed by: EMERGENCY MEDICINE

## 2024-03-26 PROCEDURE — 96374 THER/PROPH/DIAG INJ IV PUSH: CPT

## 2024-03-26 PROCEDURE — 85025 COMPLETE CBC W/AUTO DIFF WBC: CPT | Performed by: EMERGENCY MEDICINE

## 2024-03-26 PROCEDURE — 93010 ELECTROCARDIOGRAM REPORT: CPT

## 2024-03-26 PROCEDURE — 93005 ELECTROCARDIOGRAM TRACING: CPT

## 2024-03-26 PROCEDURE — 85025 COMPLETE CBC W/AUTO DIFF WBC: CPT

## 2024-03-26 RX ORDER — LOSARTAN POTASSIUM 25 MG/1
25 TABLET ORAL DAILY
Qty: 30 TABLET | Refills: 0 | Status: SHIPPED | OUTPATIENT
Start: 2024-03-26

## 2024-03-26 RX ORDER — LIDOCAINE HYDROCHLORIDE 20 MG/ML
10 SOLUTION OROPHARYNGEAL ONCE
Status: COMPLETED | OUTPATIENT
Start: 2024-03-26 | End: 2024-03-26

## 2024-03-26 RX ORDER — HYDRALAZINE HYDROCHLORIDE 20 MG/ML
5 INJECTION INTRAMUSCULAR; INTRAVENOUS ONCE
Status: COMPLETED | OUTPATIENT
Start: 2024-03-26 | End: 2024-03-26

## 2024-03-26 RX ORDER — PANTOPRAZOLE SODIUM 20 MG/1
20 TABLET, DELAYED RELEASE ORAL DAILY
Qty: 30 TABLET | Refills: 0 | Status: SHIPPED | OUTPATIENT
Start: 2024-03-26 | End: 2024-04-25

## 2024-03-26 RX ORDER — MAGNESIUM HYDROXIDE/ALUMINUM HYDROXICE/SIMETHICONE 120; 1200; 1200 MG/30ML; MG/30ML; MG/30ML
30 SUSPENSION ORAL ONCE
Status: COMPLETED | OUTPATIENT
Start: 2024-03-26 | End: 2024-03-26

## 2024-03-26 NOTE — ED PROVIDER NOTES
Patient Seen in: [place] [facility] In [place]      History     Chief Complaint   Patient presents with    Chest Pain Angina     Stated Complaint: chest pain since 8pm, dull ache left side, intermittent    Subjective:   Patient 78-year-old male presents emergency room with chest pain that occurred at 8 PM tonight while he was watching television.  Patient has known chest prior to onset.  He was recently restarted on pantoprazole for GERD.  He has not been on that medication for about 2 weeks.  Patient is scheduled to have a stress test today.  Patient has a history of A-fib he is in A-fib currently with no RVR.    The history is provided by the patient and the spouse.           Objective:   Past Medical History:   Diagnosis Date    Arrhythmia     Atrial fibrillation (HCC)     Atrial fibrillation (HCC) 10/2/2006    Dyslipidemia 9/1/2017    High blood pressure     High cholesterol     High risk medication use 3/20/2015    Hypertrophy of prostate without urinary obstruction and other lower urinary tract symptoms (LUTS)     IFG (impaired fasting glucose)     Increased BMI 3/20/2015    Osteoarthritis     Other and unspecified hyperlipidemia     Periodic limb movement 4/12/2011    Pneumonia, organism unspecified(486)     Prediabetes     Unspecified essential hypertension     Unspecified sleep apnea     cpap              Past Surgical History:   Procedure Laterality Date    APPENDECTOMY      CARDIAC DEFIBRILLATOR PLACEMENT  01/2019    COLONOSCOPY  2/08    normal    COLONOSCOPY N/A 10/4/2018    Procedure: COLONOSCOPY, POSSIBLE BIOPSY, POSSIBLE POLYPECTOMY 98124;  Surgeon: Sridhar Power MD;  Location: [facility]    HERNIA SURGERY      1987-88    OTHER SURGICAL HISTORY  07/19/2019    Cystoscopy w/ Dr Torres                Social History     Socioeconomic History    Marital status:     Number of children: 2   Occupational History    Occupation: [employer]    Tobacco Use    Smoking status: Former      Types: Cigars     Quit date: 11/30/2013     Years since quitting: 10.3    Smokeless tobacco: Never    Tobacco comments:     cigar 1 wk; quit cigs 1986   Vaping Use    Vaping Use: Never used   Substance and Sexual Activity    Alcohol use: Yes    Drug use: No    Sexual activity: Yes     Partners: Female   Other Topics Concern     Service Yes     Comment:  [employer] y[employer]    Sleep Concern No    Exercise Yes     Comment: sporadic    Seat Belt Yes     Social Determinants of Health     Physical Activity: Insufficiently Active (1/27/2022)    Exercise Vital Sign     Days of Exercise per Week: 3 days     Minutes of Exercise per Session: 20 min              Review of Systems   Cardiovascular:  Positive for chest pain.       Positive for stated complaint: chest pain since 8pm, dull ache left side, intermittent  Other systems are as noted in HPI.  Constitutional and vital signs reviewed.      All other systems reviewed and negative except as noted above.    Physical Exam     ED Triage Vitals   BP 03/26/24 0355 (!) 151/97   Pulse 03/26/24 0223 76   Resp 03/26/24 0223 18   Temp 03/26/24 0223 98.6 °F (37 °C)   Temp src 03/26/24 0223 Oral   SpO2 03/26/24 0223 99 %   O2 Device 03/26/24 0223 None (Room air)       Current:/88   Pulse 70   Temp 98.6 °F (37 °C) (Oral)   Resp 17   Ht 177.8 cm (5' 10\")   Wt 113.4 kg   SpO2 98%   BMI 35.87 kg/m²         Physical Exam  Vitals and nursing note reviewed.   Constitutional:       General: He is not in acute distress.     Appearance: He is well-developed. He is obese. He is not toxic-appearing.   HENT:      Head: Normocephalic and atraumatic.   Eyes:      Extraocular Movements: Extraocular movements intact.      Pupils: Pupils are equal, round, and reactive to light.   Cardiovascular:      Rate and Rhythm: Normal rate and regular rhythm.      Heart sounds: Normal heart sounds.   Pulmonary:      Effort: Pulmonary effort is normal.      Breath sounds: Normal breath sounds.    Chest:      Chest wall: No mass or tenderness.   Abdominal:      General: Bowel sounds are normal.      Palpations: Abdomen is soft.   Musculoskeletal:         General: Normal range of motion.      Cervical back: Normal range of motion and neck supple.   Skin:     General: Skin is warm.      Capillary Refill: Capillary refill takes less than 2 seconds.   Neurological:      General: No focal deficit present.      Mental Status: He is alert and oriented to person, place, and time.   Psychiatric:         Mood and Affect: Mood normal.         Behavior: Behavior normal.         ED Course     Labs Reviewed   COMP METABOLIC PANEL (14) - Abnormal; Notable for the following components:       Result Value    eGFR-Cr 56 (*)     All other components within normal limits   TROPONIN I HIGH SENSITIVITY - Normal   TROPONIN I HIGH SENSITIVITY - Normal   CBC WITH DIFFERENTIAL WITH PLATELET    Narrative:     The following orders were created for panel order CBC With Differential With Platelet.  Procedure                               Abnormality         Status                     ---------                               -----------         ------                     CBC W/ DIFFERENTIAL[[id number]]                              Final result                 Please view results for these tests on the individual orders.   RAINBOW DRAW BLUE   CBC W/ DIFFERENTIAL     EKG    Rate, intervals and axes as noted on EKG Report.  Rate: 70  Rhythm: Sinus Rhythm  Reading: Atrial fibs no [place] interval QRS of 112 QTc of 468 with axes of -46/9                 Chest x-ray shows no evidence of acute cardiopulmonary disease         MDM      Social -negative tobacco, negative etoh, negative drugs  Family History-noncontributory  Past Medical History-pacemaker defibrillator, A-fib, prostatic hypertrophy, prediabetes, high blood pressure, osteoarthritis, dyslipidemia, high cholesterol    Differential diagnosis before testing included GERD, esophagitis, acute  coronary syndrome    Co-morbidities that add to the complexity of management include: History of atrial fibs    Testing ordered during this visit included chest x-ray EKG 2 sets of cardiac enzymes    Radiographic images  I personally reviewed the radiographs and my individual interpretation shows no acute process  I also reviewed the official reports that showed no evidence of acute cardiopulmonary disease    External chart review showed review of care everywhere in [facility] system shows patient is scheduled to have a stress test today    History obtained by an independent source included from patient, family    Discussion of management with patient, family    Social determinants of health that affect care include not applicable      Medications Provided: GI cocktail    Course of Events during Emergency Room Visit include 78-year-old male who presents emergency room for evaluation of chest pain.  Patient recently started on PPI.  His symptoms occurred after eating at work trip tonight.  Will give a GI cocktail check his blood pressure manually treat blood pressure if elevated with medications here we will check 2 sets of cardiac enzymes initial 1 is negative.  Patient to follow-up with his cardiology as scheduled today for stress test.  Also given prescription for pantoprazole for the next month    I rechecked the patient at 4:15 AM he states the pain has subsided.  This is after his GI cocktail.  He was given hydralazine for elevated blood pressure reading here.  Will recheck it manually when we recheck the second troponin if negative for elevation secondary to plan for discharge home and follow-up with the scheduled stress test today    Patient's blood pressure improved to 155/88, patient's second troponin is negative.  His blood pressure is improved however still not perfect.  I would recommend starting losartan.  If his pressure is over 160 systolic would recommend taking a dose.  He may also start on a daily basis  however he is seeing cardiology later today and can ask for their further recommendations.  Disposition:          Discharge  I have discussed with the patient the results of test, differential diagnosis, treatment plan, warning signs and symptoms which should prompt immediate return.  They expressed understanding of these instructions and agrees to the following plan provided.  They were given written discharge instructions and agrees to return for any concerns and voiced understanding and all questions were answered.                                      Medical Decision Making      Disposition and Plan     Clinical Impression:  1. Chest pain of uncertain etiology    2. Gastroesophageal reflux disease with esophagitis without hemorrhage    3. Hypertension, unspecified type         Disposition:  Discharge  3/26/2024  5:07 am    Follow-up:  Casper Emmanuel MD  [address] [facility] [address] [phone]    Schedule an appointment as soon as possible for a visit      Rik Gil MD  [address] [facility]  [address]  [facility] [address] [phone]    Schedule an appointment as soon as possible for a visit            Medications Prescribed:  Current Discharge Medication List        START taking these medications    Details   pantoprazole 20 MG Oral Tab EC Take 1 tablet (20 mg total) by mouth daily.  Qty: 30 tablet, Refills: 0      losartan 25 MG Oral Tab Take 1 tablet (25 mg total) by mouth daily.  Qty: 30 tablet, Refills: 0

## 2024-03-26 NOTE — ED INITIAL ASSESSMENT (HPI)
Left sided chest pain, sporadic in nature, non radiating.  Rated at a 4 out of 10. Denies nausea or respiratory distress

## (undated) DEVICE — SOLUTION SET, MALE LUER LOCK ADAPTER

## (undated) DEVICE — SCD SLEEVE KNEE HI BLEND

## (undated) DEVICE — SYRINGE,TOOMEY,IRRIGATION,70CC,STERILE: Brand: MEDLINE

## (undated) DEVICE — Device

## (undated) DEVICE — EVAC URL LDSEN DF4 IBIR 64CM

## (undated) DEVICE — OPEN-END URETERAL CATHETER SOF-FLEX: Brand: SOF-FLEX

## (undated) DEVICE — SOL H2O 1000ML BTL

## (undated) DEVICE — CYSTO CDS-LF: Brand: MEDLINE INDUSTRIES, INC.

## (undated) DEVICE — STERILE POLYISOPRENE POWDER-FREE SURGICAL GLOVES: Brand: PROTEXIS

## (undated) DEVICE — HF-RESECTION ELECTRODE PLASMALOOP LOOP, MEDIUM, 24 FR., 12°/16°, ESG TURIS: Brand: OLYMPUS

## (undated) DEVICE — SOL  .9 3000ML

## (undated) DEVICE — SYRINGE 30ML LL TIP

## (undated) DEVICE — ZIPWIRE GUIDEWIRE .038X150 STR

## (undated) NOTE — IP AVS SNAPSHOT
[facility] [place] [address] [place], [address] ~ [phone]                Discharge Summary   2/16/2017    Govind Dubois           Admission Information        Provider Department    2/16/2017 Arpita Russell MD  5nw-A         T Take 1 capsule (240 mg total) by mouth once daily. Kalina Oh                           Fish Oil 1200 MG Caps        Take 1 capsule by mouth. GARLIC OR        Take  by mouth.                             Jean-Claude Huber [address] [facility]        Future Appointments     Feb 23, 2017  3:15 PM   Hospital/SNF F/U with Espinoza Carlin MD   [facility] IM/IOP)    [address] [facility]  [address]  [address] [address] 8.3 (02/16/17)  47 (02/16/17)  37      Metabolic Lab Results  (Last result in the past 90 days)    ALT Bilirubin,Total Total Protein Albumin Sodium Potassium Chloride    (02/16/17)  38 (02/16/17)  0.6 (02/16/17)  7.0 (02/16/17)  3.5 (02/16/17)  142 (02/16/ Medication Side Effects - Medications to be taken at home  As your caregivers, we want you to be aware of the medications you are prescribed to take and their potential SIDE EFFECTS.  Your nurse will review your medications with you before you are discharge dark, loose bowel movements           Blood Sugar Medications     METFORMIN  MG Oral Tab         Use:  Treat high blood sugar   Most common side effects: Low blood sugar:nausea, jitters, sweating, rapid heartbeat    What to report to your healthcare

## (undated) NOTE — ED AVS SNAPSHOT
THE [facility] Emergency Department in [address] [facility]    Phone:  [phone]    Fax:  [address] [place] [address]   MRN: [id number]    Department:  THE [facility] Emergency Department in [place]   Date of Visit IF THERE IS ANY CHANGE OR WORSENING OF YOUR CONDITION, CALL YOUR PRIMARY CARE PHYSICIAN AT ONCE OR RETURN IMMEDIATELY TO THE EMERGENCY DEPARTMENT.     If you have been prescribed any medication(s), please fill your prescription right away and begin taking t

## (undated) NOTE — IP AVS SNAPSHOT
[facility] [place]  [address] [place], [address] ~ [phone]                Discharge Summary   6/9/2017    Jenna Castro           Admission Information        Provider Department    6/9/2017 Lluvia Padilla MD  Intervsalinasnl Suit Take 1 capsule (0.5 mg total) by mouth daily. Lord Oconnor     [    ]    [    ]    [    ]    [    ]       Fish Oil 1200 MG Caps        Take 1 capsule by mouth.       [    ]    [    ]    [    ]    [    ]       ibuprofen 800 MG Tabs   Commonly known as: [    ]    [    ]                 Patient Instructions       Follow pre printed attached instructions. Resume preop diet and meds. Follow up with Dr Joyce Chao in 3 weeks, call for appointment.      Follow-up Information     Follow up with Max Lloyd MD In Recent Hematology Lab Results (cont.)  (Last 3 results in the past 90 days)    Neutrophil % Lymphocyte % Monocyte % Eosinophil % Basophil % Prelim Neut Abs Final Neut Abs Lymphocyte Abso Monocyte Absolu Eosinophil Abso Basophil Absolu    (04/23/17)  65.2 ( Medicaid plans. To get signed up and covered, please call [phone] and ask to get set up for an insurance coverage that is in-network with [facility].         MyChart     Visit [employer]  You can access your MyChart to more actively manage Cholesterol Lowering Medications     Lovastatin 20 MG Oral Tab    Niacin ER, Antihyperlipidemic, 1000 MG Oral Tab CR       Use: Lower cholesterol, protect your heart   Most common side effects: Dizziness, constipation, abnormal liver function   What to rep All Other Medications     Vitamin D, Cholecalciferol, 1000 units Oral Tab    Multiple Vitamin (ONE-DAILY MULTI VITAMINS) Oral Tab    Vitamin C 500 MG Oral Tab    Dutasteride 0.5 MG Oral Cap    Silodosin (RAPAFLO) 8 MG Oral Cap    Omega-3 Fatty Acids (FISH

## (undated) NOTE — ED AVS SNAPSHOT
THE [facility] Emergency Department in [address] [facility]    Phone:  [phone]    Fax:  [address] [place] [address]   MRN: [id number]    Department:  THE [facility] Emergency Department in [place]   Date of Visit Oseltamivir Phosphate 75 MG Caps   Quantity:  10 capsule   Commonly known as:  TAMIFLU   Take 1 capsule (75 mg total) by mouth 2 (two) times daily.             Where to Get Your Medications      You can get these medications from any pharmacy     Bring a p primary care or specialist physician will see patients referred from the [facility] Emergency Department. Follow-up care is at the discretion of that Physician.     IF THERE IS ANY CHANGE OR WORSENING OF YOUR CONDITION, CALL YOUR PRIMARY CARE PHYSICIAN - If you have concerns related to behavioral health issues or thoughts of harming yourself, contact [address] [place] [address] at [phone].     - If you don’t have insurance, [facility] has partnered with [employer]

## (undated) NOTE — ED AVS SNAPSHOT
Mandi Brian   MRN: [id number]    Department:  [facility] in [place]   Date of Visit:  4/26/2018           Disclosure     Insurance plans vary and the physician(s) referred by the ER may not be covered by your plan.  Please conta tell this physician (or your personal doctor if your instructions are to return to your personal doctor) about any new or lasting problems. The primary care or specialist physician will see patients referred from the [facility] Emergency Department.  Ti Rodriguez

## (undated) NOTE — ED AVS SNAPSHOT
Geronimo Trivedi   MRN: [id number]    Department:  THE [facility] Emergency Department in [place]   Date of Visit:  2/18/2020           Disclosure     Insurance plans vary and the physician(s) referred by the ER may not be covered by your plan.  Please conta tell this physician (or your personal doctor if your instructions are to return to your personal doctor) about any new or lasting problems. The primary care or specialist physician will see patients referred from the [facility] Emergency Department.  Cheryle Levins

## (undated) NOTE — LETTER
[facility] Cardiac Device Communication Tool    Preop to complete    [facility] ([address]) Phone: [phone] Fax: [phone]   Patient Name Jo Hernandez   Patient  - AGE - SEX 1945 - A: 67 y - male   Surgical Date 2021   Surg

## (undated) NOTE — IP AVS SNAPSHOT
[facility] [place] [address] [place] [address], [address] ~ [phone]                Discharge Summary   4/19/2017    Jenna Castro           Admission Information        Provider Department    4/19/2017 Lilian Flowers MD  5nw- CHANGE how you take these medications        Instructions Authorizing Provider    Morning Afternoon Evening As Needed    Rivaroxaban 20 MG Tabs   Last time this was given:  20 mg on 4/23/2017  8:24 PM   Commonly known as:  Reid Desai   What changed:  when to ONE-DAILY MULTI VITAMINS Tabs        Take 1 tablet by mouth daily. Quinapril HCl 40 MG Tabs   Commonly known as:  ACCUPRIL        Take 1 tablet (40 mg total) by mouth 2 (two) times daily.     Kalina Oh 4. You can expect a moderate amount of pain and discomfort. If this pain experienced  is excessive or excruciating please contact the office immediately. 5. There will be a moderate amount of swelling and bruising.  This may extend down  into the scrotum a Specialties:  GASTROENTEROLOGY, Internal Medicine    Contact information:    [address] [facility] [address]  [phone]        Future Appointments     Jun 02, 2017  8:40 AM   FOLLOW UP with MD GARY Dawn CARDIOLOGY [facility] at [address] 0.84 (L) (04/22/17)  0.27 (04/22/17)  0.06 (04/22/17)  0.02    (04/21/17)  85.7 (04/21/17)  9.3 (04/21/17)  3.5 (04/21/17)  0.2 (04/21/17)  0.4  (04/21/17)  3.97 (04/21/17)  0.43 (L) (04/21/17)  0.16 (04/21/17)  0.01 (04/21/17)  1.65      Metabolic Lab Res and ask to get set up for an insurance coverage that is in-network with [facility].         MyChart     Visit [employer]  You can access your [employer] to more actively manage your health care and view more details from this visit by going to https:/ Use: Treat abnormal blood pressure (high or low), cardiac conditions; and/or abnormal heart rates/rhythms   Most common side effects: Dizziness or feeling lightheaded (especially with standing), heart rate changes, headaches, nausea/vomiting   What to repo Non-Narcotic Pain Medications     ibuprofen 800 MG Oral Tab    methocarbamol 500 MG Oral Tab    aspirin 81 MG Oral Tab       Use: Treat pain, fever, inflammation   Most common side effects: Stomach upset   What to report to your healthcare team: Deysi Carrero

## (undated) NOTE — ED AVS SNAPSHOT
Nasir Rodriguez   MRN: [id number]    Department:  [facility] Emergency Department   Date of Visit:  4/28/2018           Disclosure     Insurance plans vary and the physician(s) referred by the ER may not be covered by your plan.  Please contact yo tell this physician (or your personal doctor if your instructions are to return to your personal doctor) about any new or lasting problems. The primary care or specialist physician will see patients referred from the [facility] Emergency Department.  Stephen Odom

## (undated) NOTE — ED AVS SNAPSHOT
Augusto Jernigan   MRN: [id number]    Department:  [facility] Emergency Department   Date of Visit:  3/8/2019           Disclosure     Insurance plans vary and the physician(s) referred by the ER may not be covered by your plan.  Please contact you tell this physician (or your personal doctor if your instructions are to return to your personal doctor) about any new or lasting problems. The primary care or specialist physician will see patients referred from the [facility] Emergency Department.  Wing Caraballo

## (undated) NOTE — LETTER
[facility] Cardiac Device Communication Tool    Preop to complete    Patient Name [address]   Patient  - AGE - SEX 1945 - A: 67 y - male   Surgical Date 2021   Surgical Procedure CYSTOSCOPY, TRANSURETHRAL HOLMIUM LASER ENUCLEATION OF PROSTATE